# Patient Record
Sex: FEMALE | Race: WHITE | Employment: FULL TIME | URBAN - METROPOLITAN AREA
[De-identification: names, ages, dates, MRNs, and addresses within clinical notes are randomized per-mention and may not be internally consistent; named-entity substitution may affect disease eponyms.]

---

## 2017-07-12 ENCOUNTER — OFFICE VISIT (OUTPATIENT)
Dept: OTHER | Facility: OUTPATIENT CENTER | Age: 62
End: 2017-07-12

## 2017-07-12 DIAGNOSIS — F43.10 POSTTRAUMATIC STRESS DISORDER: ICD-10-CM

## 2017-07-12 DIAGNOSIS — F43.22 ADJUSTMENT DISORDER WITH ANXIETY: Primary | ICD-10-CM
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
## 2017-07-12 NOTE — MR AVS SNAPSHOT
After Visit Summary   2017    Herminia Garcia    MRN: [id number]           Patient Information     Date Of Birth          1955        Visit Information        Provider Department      2017 10:00 AM Mahendra Kiser, PhD [facility]        Today's Diagnoses     Adjustment disorder with anxiety    -  1    Posttraumatic stress disorder per history            Follow-ups after your visit        Your next 10 appointments already scheduled     Aug 28, 2017  1:30 PM CDT   INDIVIDUAL THERAPY with ELIJAH Byers   [facility]    [address]  [address]  [facility] [address]   [phone]              Who to contact     Please call your clinic at [phone] to:    Ask questions about your health    Make or cancel appointments    Discuss your medicines    Learn about your test results    Speak to your doctor   If you have compliments or concerns about an experience at your clinic, or if you wish to file a complaint, please contact [facility] Patient Relations at [phone] or email us at [email or web address]         Additional Information About Your Visit        MyChart Information     [email or web address] is an electronic gateway that provides easy, online access to your medical records. With [email or web address], you can request a clinic appointment, read your test results, renew a prescription or communicate with your care team.     To sign up for [employer] visit the website at [email or web address]   You will be asked to enter the access code listed below, as well as some personal information. Please follow the directions to create your username and password.     Your access code is: [email or web address]  Expires: 2017 12:22 PM     Your access code will  in 90 days. If you need help or a new code, please contact your [facility] Clinic or call [phone] for  assistance.        Care EveryWhere ID     This is your Care EveryWhere ID. This could be used by other organizations to access your [place] medical records  [id number]         Blood Pressure from Last 3 Encounters:   No data found for BP    Weight from Last 3 Encounters:   No data found for Wt              We Performed the Following     Diagnostic Assessment (complete) [16145]        Primary Care Provider    None Specified       No primary provider on file.        Equal Access to Services     [facility]: Hadii aad ku hadasho Soomaali, waaxda luqadaha, qaybta kaalmada adeegyada, waxay prakashin hayaan adeeneida hartricardolara ladeisy . So [facility] [phone].    ATENCIÓN: Si habla español, tiene a garcia disposición servicios gratuitos de asistencia lingüística. Llame al [phone].    We comply with applicable federal civil rights laws and [place] laws. We do not discriminate on the basis of race, color, national origin, age, disability sex, sexual orientation or gender identity.            Thank you!     Thank you for choosing [place] [facility]  for your care. Our goal is always to provide you with excellent care. Hearing back from our patients is one way we can continue to improve our services. Please take a few minutes to complete the written survey that you may receive in the mail after your visit with us. Thank you!             Your Updated Medication List - Protect others around you: Learn how to safely use, store and throw away your medicines at www.disposemymeds.org.      Notice  As of 7/12/2017 11:59 PM    You have not been prescribed any medications.

## 2017-07-13 ASSESSMENT — ANXIETY QUESTIONNAIRES
1. FEELING NERVOUS, ANXIOUS, OR ON EDGE: NOT AT ALL
3. WORRYING TOO MUCH ABOUT DIFFERENT THINGS: NOT AT ALL
6. BECOMING EASILY ANNOYED OR IRRITABLE: NOT AT ALL
7. FEELING AFRAID AS IF SOMETHING AWFUL MIGHT HAPPEN: NOT AT ALL
GAD7 TOTAL SCORE: 0
2. NOT BEING ABLE TO STOP OR CONTROL WORRYING: NOT AT ALL
4. TROUBLE RELAXING: NOT AT ALL
5. BEING SO RESTLESS THAT IT IS HARD TO SIT STILL: NOT AT ALL

## 2017-07-14 PROBLEM — F43.22 ADJUSTMENT DISORDER WITH ANXIETY: Status: ACTIVE | Noted: 2017-07-14

## 2017-07-14 PROBLEM — F43.10 POSTTRAUMATIC STRESS DISORDER: Status: ACTIVE | Noted: 2017-07-14

## 2017-07-14 ASSESSMENT — PATIENT HEALTH QUESTIONNAIRE - PHQ9: SUM OF ALL RESPONSES TO PHQ QUESTIONS 1-9: 1

## 2017-07-14 ASSESSMENT — ANXIETY QUESTIONNAIRES: GAD7 TOTAL SCORE: 0

## 2017-08-14 ENCOUNTER — OFFICE VISIT (OUTPATIENT)
Dept: OTHER | Facility: OUTPATIENT CENTER | Age: 62
End: 2017-08-14

## 2017-08-14 DIAGNOSIS — F43.22 ADJUSTMENT DISORDER WITH ANXIETY: Primary | ICD-10-CM

## 2017-08-14 NOTE — MR AVS SNAPSHOT
After Visit Summary   2017    Herminia Garcia    MRN: [id number]           Patient Information     Date Of Birth          1955        Visit Information        Provider Department      2017 2:30 PM Diane Menendez LMFT [facility]        Today's Diagnoses     Adjustment disorder with anxiety    -  1       Follow-ups after your visit        Your next 10 appointments already scheduled     Aug 28, 2017  1:30 PM CDT   INDIVIDUAL THERAPY with ELIJAH Byers   [place] [facility] ([facility])    [address]  Mail [address] 7521  [facility] [address]   [phone]              Who to contact     Please call your clinic at [phone] to:    Ask questions about your health    Make or cancel appointments    Discuss your medicines    Learn about your test results    Speak to your doctor   If you have compliments or concerns about an experience at your clinic, or if you wish to file a complaint, please contact [facility] Patient Relations at [phone] or email us at [email or web address]         Additional Information About Your Visit        MyChart Information     xaitment is an electronic gateway that provides easy, online access to your medical records. With xaitment, you can request a clinic appointment, read your test results, renew a prescription or communicate with your care team.     To sign up for xaitment visit the website at [email or web address]   You will be asked to enter the access code listed below, as well as some personal information. Please follow the directions to create your username and password.     Your access code is: [email or web address]  Expires: 2017 12:22 PM     Your access code will  in 90 days. If you need help or a new code, please contact your [facility] or call [phone] for assistance.        Care EveryWhere ID     This is your Care  EveryWhere ID. This could be used by other organizations to access your [place] medical records  [id number]         Blood Pressure from Last 3 Encounters:   No data found for BP    Weight from Last 3 Encounters:   No data found for Wt              We Performed the Following     Individual Psychotherapy (53+ min) [48043]        Primary Care Provider    None Specified       No primary provider on file.        Equal Access to Services     [facility]: Hadii aad ku hadasho Soomaali, waaxda luqadaha, qaybta kaalmada denice, manasa hartricardolara shannon . So [facility] [phone].    ATENCIÓN: Si habla español, tiene a garcia disposición servicios gratuitos de asistencia lingüística. Llame al [phone].    We comply with applicable federal civil rights laws and [place] laws. We do not discriminate on the basis of race, color, national origin, age, disability sex, sexual orientation or gender identity.            Thank you!     Thank you for choosing [place] [facility]  for your care. Our goal is always to provide you with excellent care. Hearing back from our patients is one way we can continue to improve our services. Please take a few minutes to complete the written survey that you may receive in the mail after your visit with us. Thank you!             Your Updated Medication List - Protect others around you: Learn how to safely use, store and throw away your medicines at www.disposemymeds.org.      Notice  As of 8/14/2017 11:59 PM    You have not been prescribed any medications.

## 2017-08-15 NOTE — PROGRESS NOTES
[facility] -  Case Progress Note    Date of Service: Aug 14, 2017  Client Name: Herminia Garcia  YOB: 1955  MRN:  [id number]  Treating Provider: ELIJAH Velez  Type of Session: Individual  Present in Session: Herminia  Number of Minutes:  55    Current Symptoms/Status:  Herminia expresses marked distress over 's cross-dressing and an inability to address it directly with her .  The tension between her and her spouse due to this issue has been going on for more than 12 months.  She reports concerns over the implications of his possible gender and/or identity needs on her marriage and her sense of self and metal wellbeing.     Progress Toward Treatment Goals:   Goals will be set in next session. This was the first session with this provider.    Intervention: Modality and Description:  Building therapeutic rapport and gathering more information. Herminia discussed how the changes in her  have had a profound impact on her. She stated she feels she is living with big secrets and she feels this is unhealthy. She has spoken to one sister about the changes in her marriage, and one previous therapist. She stated the previous therapist told her to not tell anyone, which she feels has left her feeling lonely and  from her social network. Herminia described a history of having to be the problem solver and ask for more communication from her . She stated she has built up resentment due to feeling emotionally distant and alone when facing family problems. With current changes, she feels her  is not considering her perspective and she feels like a non-person, though she is also happy that he is finding himself. This has left her feeling very ambivalent about the changes.    Herminia also discussed at length her health problems and how stressful they have been to her. She has sought help through several complementary health practitioners, and engages in  meditation to address nutritional needs and relaxation/stress management.    Response to Intervention:  Herminia was very open and process oriented.    Assignment:  Return for next session    Diagnosis:  309.24 (F43.22) Adjustment Disorder with Anxiety     Plan / Need for Future Services:  Return for therapy in 2 weeks.      Diane Menendez LMFT

## 2017-08-19 NOTE — PROGRESS NOTES
I did not personally see the patient.  I reviewed and agree with the assessment and plan as documented in this note.     Gabrielle Joyce PsyD, LP

## 2017-08-28 ENCOUNTER — OFFICE VISIT (OUTPATIENT)
Dept: OTHER | Facility: OUTPATIENT CENTER | Age: 62
End: 2017-08-28

## 2017-08-28 DIAGNOSIS — F43.22 ADJUSTMENT DISORDER WITH ANXIETY: Primary | ICD-10-CM

## 2017-08-28 NOTE — MR AVS SNAPSHOT
After Visit Summary   8/28/2017    Herminia Garcia    MRN: [id number]           Patient Information     Date Of Birth          1955        Visit Information        Provider Department      8/28/2017 1:30 PM Diane Menendez [facility] Sexual Health        Today's Diagnoses     Adjustment disorder with anxiety    -  1       Follow-ups after your visit        Your next 10 appointments already scheduled     Sep 14, 2017  2:00 PM CDT   INDIVIDUAL THERAPY with Diane Menendez [facility] ([facility])    [address]  Mail [address] 7521  [facility] [address]   [phone]            Sep 28, 2017  2:00 PM CDT   INDIVIDUAL THERAPY with Diane Menendez [facility] ([facility])    [address]  [address] 75[address]  [facility] [address]   [phone]            Oct 12, 2017  2:00 PM CDT   INDIVIDUAL THERAPY with Diane Menendez [facility] ([facility])    [address]  [address] 7521  [facility] [address]   [phone]            Oct 26, 2017  2:00 PM CDT   INDIVIDUAL THERAPY with Diane Menendez [facility] ([facility])    [address]  [address] 75[address]  [facility] [address]   [phone]              Who to contact     Please call your clinic at [phone] to:    Ask questions about your health    Make or cancel appointments    Discuss your medicines    Learn about your test results    Speak to your doctor   If you have compliments or concerns about an experience at your clinic, or if you wish to file a complaint, please contact [facility] Patient Relations at [phone] or email us at [email or web address]         Additional Information About Your Visit        Robert Applebaum MDhart Information     [employer] is an electronic gateway that provides easy, online access to your medical records. With [employer], you can  request a clinic appointment, read your test results, renew a prescription or communicate with your care team.     To sign up for Intelligent InSitest visit the website at [email or web address]   You will be asked to enter the access code listed below, as well as some personal information. Please follow the directions to create your username and password.     Your access code is: [email or web address]  Expires: 2017 12:22 PM     Your access code will  in 90 days. If you need help or a new code, please contact your [facility] or call [phone] for assistance.        Care EveryWhere ID     This is your Care EveryWhere ID. This could be used by other organizations to access your [place] medical records  [id number]         Blood Pressure from Last 3 Encounters:   No data found for BP    Weight from Last 3 Encounters:   No data found for Wt              We Performed the Following     Individual Psychotherapy (53+ min) [81637]        Primary Care Provider    None Specified       No primary provider on file.        Equal Access to Services     [facility] : Hadii les ku hadasho Soomaali, waaxda luqadaha, qaybta kaalmada adeegyada, manasa shannon . So [facility] [phone].    ATENCIÓN: Si habla español, tiene a garcia disposición servicios gratuitos de asistencia lingüística. Llame al [phone].    We comply with applicable federal civil rights laws and [place] laws. We do not discriminate on the basis of race, color, national origin, age, disability sex, sexual orientation or gender identity.            Thank you!     Thank you for choosing [place] [facility]  for your care. Our goal is always to provide you with excellent care. Hearing back from our patients is one way we can continue to improve our services. Please take a few minutes to complete the written survey that you may receive in the mail after your visit with us. Thank you!             Your Updated  Medication List - Protect others around you: Learn how to safely use, store and throw away your medicines at www.disposemymeds.org.      Notice  As of 8/28/2017 11:59 PM    You have not been prescribed any medications.
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
## 2017-09-14 ENCOUNTER — OFFICE VISIT (OUTPATIENT)
Dept: OTHER | Facility: OUTPATIENT CENTER | Age: 62
End: 2017-09-14

## 2017-09-14 DIAGNOSIS — F43.22 ADJUSTMENT DISORDER WITH ANXIETY: Primary | ICD-10-CM

## 2017-09-14 NOTE — MR AVS SNAPSHOT
After Visit Summary   9/14/2017    Herminia Garcia    MRN: [id number]           Patient Information     Date Of Birth          1955        Visit Information        Provider Department      9/14/2017 2:00 PM Diane Menendez [facility] Sexual Health        Today's Diagnoses     Adjustment disorder with anxiety    -  1       Follow-ups after your visit        Your next 10 appointments already scheduled     Sep 28, 2017  2:00 PM CDT   INDIVIDUAL THERAPY with Diane Menendez [facility] ([facility])    [address]  Mail [address] 7521  [facility] [address]   [phone]            Oct 12, 2017  2:00 PM CDT   INDIVIDUAL THERAPY with Diane Menendez [facility] ([facility])    [address]  [address] 75[address]  [facility] [address]   [phone]            Oct 26, 2017  2:00 PM CDT   INDIVIDUAL THERAPY with Diane Menendez [facility] ([facility])    [address]  [address] 75[address]  [facility] [address]   [phone]            Nov 08, 2017  2:00 PM CST   INDIVIDUAL THERAPY with Diane Menendez [facility] ([facility])    [address]  [address] 75[address]  [facility] [address]   [phone]            Nov 22, 2017  2:00 PM CST   INDIVIDUAL THERAPY with Diane Menendez [facility] for [facility] ([facility])    [address]  [address]  [facility] [address]   [phone]              Who to contact     Please call your clinic at [phone] to:    Ask questions about your health    Make or cancel appointments    Discuss your medicines    Learn about your test results    Speak to your doctor   If you have compliments or concerns about an experience at your clinic, or if you wish to file a complaint, please contact [facility] Patient Relations at [phone] or email us at  [email or web address]         Additional Information About Your Visit        CovarioharCozy Information     [employer] is an electronic gateway that provides easy, online access to your medical records. With rVue, you can request a clinic appointment, read your test results, renew a prescription or communicate with your care team.     To sign up for rVue visit the website at [email or web address]   You will be asked to enter the access code listed below, as well as some personal information. Please follow the directions to create your username and password.     Your access code is: [email or web address]  Expires: 2017 12:22 PM     Your access code will  in 90 days. If you need help or a new code, please contact your [facility] or call [phone] for assistance.        Care EveryWhere ID     This is your Care EveryWhere ID. This could be used by other organizations to access your [place] medical records  [id number]         Blood Pressure from Last 3 Encounters:   No data found for BP    Weight from Last 3 Encounters:   No data found for Wt              We Performed the Following     Individual Psychotherapy (53+ min) [86065]        Primary Care Provider    None Specified       No primary provider on file.        Equal Access to Services     SILVANO MANE AH: Saadia Tamez, wasamir saldana, qaradha kanancyda denice, manasa dixon. So [facility] [phone].    ATENCIÓN: Si habla español, tiene a garcia disposición servicios gratuitos de asistencia lingüística. Llame al [phone].    We comply with applicable federal civil rights laws and [place] laws. We do not discriminate on the basis of race, color, national origin, age, disability sex, sexual orientation or gender identity.            Thank you!     Thank you for choosing [facility]  for your care. Our goal is always to provide you with excellent care. Hearing  back from our patients is one way we can continue to improve our services. Please take a few minutes to complete the written survey that you may receive in the mail after your visit with us. Thank you!             Your Updated Medication List - Protect others around you: Learn how to safely use, store and throw away your medicines at www.disposemymeds.org.      Notice  As of 9/14/2017 11:59 PM    You have not been prescribed any medications.

## 2017-09-18 NOTE — PROGRESS NOTES
[place] [facility] -  Case Progress Note    Date of Service: Sept 14, 2017  Client Name: Herminia Garcia  YOB: 1955  MRN:  [id number]  Treating Provider: ELIJAH Velez  Type of Session: Individual  Present in Session: Herminia  Number of Minutes:  55    Current Symptoms/Status:  Herminia expresses marked distress over 's cross-dressing and an inability to address it directly with her .  The tension between her and her spouse due to this issue has been going on for more than 12 months.  She reports concerns over the implications of his possible gender and/or identity needs on her marriage and her sense of self and metal wellbeing.     Progress Toward Treatment Goals:   Goals include decreasing worrying and adjust to changes related to Don's recent expressed interest in identifying as transgender.    Intervention: Modality and Description:  Interpersonal therapy to address Herminia's concerns about her marriage. Herminia stated she feels isolated and left behind in her 's changes. She described both anger at Don due to a history of making her the person who always has to problem solve and sadness that she feels she has lost her partner. We discussed her tendency towards adjusting to Don and putting his feelings before hers. She noted that she has always worried about his depression and caretaked him. She described caretaking in her family of origin as a middle child who was worried about peace.    Response to Intervention:  Herminia was very open and process oriented.    Assignment:  Journal about self-care    Diagnosis:  309.24 (F43.22) Adjustment Disorder with Anxiety     Plan / Need for Future Services:  Return for therapy in 2 weeks.      ELIJAH Velez

## 2017-09-27 ENCOUNTER — TELEPHONE (OUTPATIENT)
Dept: OTHER | Facility: OUTPATIENT CENTER | Age: 62
End: 2017-09-27

## 2017-09-27 NOTE — TELEPHONE ENCOUNTER
Returned a phone call from Herminia regarding denial by P. We discussed options including: referral to Alexsander Paul MS, LMFT, at [facility]; changing to [employer] for Jan 1; starting family therapy earlier; private pay. Herminia will check her finances and discuss with Kaylie Menendez, PhD, LMFT

## 2018-01-03 ENCOUNTER — OFFICE VISIT (OUTPATIENT)
Dept: OTHER | Facility: OUTPATIENT CENTER | Age: 63
End: 2018-01-03

## 2018-01-03 DIAGNOSIS — F43.22 ADJUSTMENT DISORDER WITH ANXIETY: Primary | ICD-10-CM
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
## 2018-01-03 NOTE — MR AVS SNAPSHOT
After Visit Summary   1/3/2018    Herminia Garcia    MRN: [id number]           Patient Information     Date Of Birth          1955        Visit Information        Provider Department      1/3/2018 11:00 AM Diane Menendez LMFT [facility]        Today's Diagnoses     Adjustment disorder with anxiety    -  1       Follow-ups after your visit        Your next 10 appointments already scheduled     2018 11:00 AM CST   INDIVIDUAL THERAPY with ELIJAH Byers   [facility]    [address]  [address] 7521  [facility] [address]   [phone]              Who to contact     Please call your clinic at [phone] to:    Ask questions about your health    Make or cancel appointments    Discuss your medicines    Learn about your test results    Speak to your doctor   If you have compliments or concerns about an experience at your clinic, or if you wish to file a complaint, please contact [facility] Patient Relations at [phone] or email us at [email or web address]         Additional Information About Your Visit        MyChart Information     [employer] is an electronic gateway that provides easy, online access to your medical records. With [employer], you can request a clinic appointment, read your test results, renew a prescription or communicate with your care team.     To sign up for TripsByTips visit the website at [email or web address]   You will be asked to enter the access code listed below, as well as some personal information. Please follow the directions to create your username and password.     Your access code is: [email or web address]  Expires: 4/3/2018  1:15 PM     Your access code will  in 90 days. If you need help or a new code, please contact your [facility] or call [phone] for assistance.        Care EveryWhere ID     This is your Care  EveryWhere ID. This could be used by other organizations to access your [place] medical records  [id number]         Blood Pressure from Last 3 Encounters:   No data found for BP    Weight from Last 3 Encounters:   No data found for Wt              We Performed the Following     Individual Psychotherapy (53+ min) [14743]        Primary Care Provider    None Specified       No primary provider on file.        Equal Access to Services     [facility]: Hadii aad ku hadasho Soomaali, waaxda luqadaha, qaybta kaalmada denice, manasa hartricardolara shannon . So [facility] [phone].    ATENCIÓN: Si habla español, tiene a garcia disposición servicios gratuitos de asistencia lingüística. Llame al [phone].    We comply with applicable federal civil rights laws and [place] laws. We do not discriminate on the basis of race, color, national origin, age, disability, sex, sexual orientation, or gender identity.            Thank you!     Thank you for choosing [place] [facility]  for your care. Our goal is always to provide you with excellent care. Hearing back from our patients is one way we can continue to improve our services. Please take a few minutes to complete the written survey that you may receive in the mail after your visit with us. Thank you!             Your Updated Medication List - Protect others around you: Learn how to safely use, store and throw away your medicines at www.disposemymeds.org.      Notice  As of 1/3/2018  1:15 PM    You have not been prescribed any medications.

## 2018-01-18 ENCOUNTER — OFFICE VISIT (OUTPATIENT)
Dept: OTHER | Facility: OUTPATIENT CENTER | Age: 63
End: 2018-01-18
Payer: COMMERCIAL

## 2018-01-18 DIAGNOSIS — F43.22 ADJUSTMENT DISORDER WITH ANXIETY: Primary | ICD-10-CM

## 2018-01-18 NOTE — MR AVS SNAPSHOT
After Visit Summary   1/18/2018    Herminia Garcia    MRN: [id number]           Patient Information     Date Of Birth          1955        Visit Information        Provider Department      1/18/2018 11:00 AM Diane Menendez [facility] Sexual Health        Today's Diagnoses     Adjustment disorder with anxiety    -  1       Follow-ups after your visit        Your next 10 appointments already scheduled     Jan 31, 2018  2:00 PM CST   INDIVIDUAL THERAPY with Diane Menendez [facility] ([facility])    [address]  Mail Code 7521  [facility] [address]   [phone]            Feb 13, 2018  2:00 PM CST   INDIVIDUAL THERAPY with Diane Menendez [facility] ([facility])    [address]  Mail [address] 7521  [facility] [address]   [phone]              Who to contact     Please call your clinic at [phone] to:    Ask questions about your health    Make or cancel appointments    Discuss your medicines    Learn about your test results    Speak to your doctor   If you have compliments or concerns about an experience at your clinic, or if you wish to file a complaint, please contact [facility] Patient Relations at [phone] or email us at [email or web address]         Additional Information About Your Visit        MyChart Information     [employer] is an electronic gateway that provides easy, online access to your medical records. With [employer], you can request a clinic appointment, read your test results, renew a prescription or communicate with your care team.     To sign up for Cogency Softwaret visit the website at [email or web address]   You will be asked to enter the access code listed below, as well as some personal information. Please follow the directions to create your username and password.     Your access code is: [email or web address]  Expires: 4/3/2018  1:15 PM     Your  access code will  in 90 days. If you need help or a new code, please contact your [facility] or call [phone] for assistance.        Care EveryWhere ID     This is your Care EveryWhere ID. This could be used by other organizations to access your [place] medical records  [id number]         Blood Pressure from Last 3 Encounters:   No data found for BP    Weight from Last 3 Encounters:   No data found for Wt              We Performed the Following     Individual Psychotherapy (53+ min) [70425]        Primary Care Provider    None Specified       No primary provider on file.        Equal Access to Services     [facility]: Hadii aad janet hadhiwoto Soannie, waaxda luqadaha, qaybstephanie kaalmamarlee galdamez, manasa shannon . So [facility] [phone].    ATENCIÓN: Si habla español, tiene a garcia disposición servicios gratuitos de asistencia lingüística. Llame al [phone].    We comply with applicable federal civil rights laws and [place] laws. We do not discriminate on the basis of race, color, national origin, age, disability, sex, sexual orientation, or gender identity.            Thank you!     Thank you for choosing [place] [facility]  for your care. Our goal is always to provide you with excellent care. Hearing back from our patients is one way we can continue to improve our services. Please take a few minutes to complete the written survey that you may receive in the mail after your visit with us. Thank you!             Your Updated Medication List - Protect others around you: Learn how to safely use, store and throw away your medicines at www.disposemymeds.org.      Notice  As of 2018 11:59 PM    You have not been prescribed any medications.

## 2018-02-07 ENCOUNTER — OFFICE VISIT (OUTPATIENT)
Dept: OTHER | Facility: OUTPATIENT CENTER | Age: 63
End: 2018-02-07
Payer: COMMERCIAL

## 2018-02-07 DIAGNOSIS — F43.22 ADJUSTMENT DISORDER WITH ANXIETY: Primary | ICD-10-CM

## 2018-02-07 NOTE — MR AVS SNAPSHOT
After Visit Summary   2018    Herminia Garcia    MRN: [id number]           Patient Information     Date Of Birth          1955        Visit Information        Provider Department      2018 2:00 PM Diane Menendez LMFT [facility]        Today's Diagnoses     Adjustment disorder with anxiety    -  1       Follow-ups after your visit        Your next 10 appointments already scheduled     2018  2:00 PM CST   INDIVIDUAL THERAPY with ELIJAH Byers   [place] [facility])    [address]  Mail Code 7521  [facility] [address]   [phone]              Who to contact     Please call your clinic at [phone] to:    Ask questions about your health    Make or cancel appointments    Discuss your medicines    Learn about your test results    Speak to your doctor            Additional Information About Your Visit        MyChart Information     Luminal is an electronic gateway that provides easy, online access to your medical records. With Luminal, you can request a clinic appointment, read your test results, renew a prescription or communicate with your care team.     To sign up for PINC Solutionst visit the website at [email or web address]   You will be asked to enter the access code listed below, as well as some personal information. Please follow the directions to create your username and password.     Your access code is: [email or web address]  Expires: 4/3/2018  1:15 PM     Your access code will  in 90 days. If you need help or a new code, please contact your [facility] or call [phone] for assistance.        Care EveryWhere ID     This is your Care EveryWhere ID. This could be used by other organizations to access your [place] medical records  [id number]         Blood Pressure from Last 3 Encounters:   No data found for BP    Weight from Last 3 Encounters:   No data found for Wt               We Performed the Following     Individual Psychotherapy (53+ min) [35085]        Primary Care Provider    None Specified       No primary provider on file.        Equal Access to Services     SILVANO MANE : Saadia Tamez, khalif saldana, quiqueradha chuamamarlee galdamez, waxsuzanne prakashin hayaaebony morriseneida haileyricardolara joshiZenobiasuma dixon. So [facility] [phone].    ATENCIÓN: Si habla español, tiene a garcia disposición servicios gratuitos de asistencia lingüística. Llame al [phone].    We comply with applicable federal civil rights laws and [place] laws. We do not discriminate on the basis of race, color, national origin, age, disability, sex, sexual orientation, or gender identity.            Thank you!     Thank you for choosing [place] [facility]  for your care. Our goal is always to provide you with excellent care. Hearing back from our patients is one way we can continue to improve our services. Please take a few minutes to complete the written survey that you may receive in the mail after your visit with us. Thank you!             Your Updated Medication List - Protect others around you: Learn how to safely use, store and throw away your medicines at www.disposemymeds.org.      Notice  As of 2/7/2018 11:59 PM    You have not been prescribed any medications.

## 2018-02-08 NOTE — PROGRESS NOTES
[place] [facility] -  Case Progress Note    Date of Service: Feb 7, 2018  Client Name: Herminia Garcia  YOB: 1955  MRN:  [id number]  Treating Provider: ELIJAH Velez  Type of Session: Individual  Present in Session: Herminia  Number of Minutes:  60    Current Symptoms/Status:  Herminia expresses marked distress over 's cross-dressing and an inability to address it directly with her .  The tension between her and her spouse due to this issue has been going on for more than 12 months.  She reports concerns over the implications of his possible gender and/or identity needs on her marriage and her sense of self and metal wellbeing.     Progress Toward Treatment Goals:   Goals include decreasing worrying and adjust to changes related to Trenton's recent expressed interest in identifying as transgender.    Intervention: Modality and Description:  Interpersonal therapy to address Herminia's concerns about her marriage. Herminia discussed her anxiety about hiding Trenton's identity from others and how this has had a large impact on her relationships. Because relationships are part of her own coping, this is important to address with Trenton. Herminia stated that she wants to find a way to be able to come to some shared understanding with Trenton, whether or not they stay together. She problem solved ways to communicate her needs and feelings to Trenton.     Herminia also addressed her needs related to maintaining physical health and reducing pain.    Response to Intervention:  Herminia was very open and process oriented.    Assignment:  Journal about self-care    Diagnosis:  309.24 (F43.22) Adjustment Disorder with Anxiety     Plan / Need for Future Services:  Return for therapy in 2 weeks.      ELIJAH Velez

## 2018-02-22 ENCOUNTER — OFFICE VISIT (OUTPATIENT)
Dept: OTHER | Facility: OUTPATIENT CENTER | Age: 63
End: 2018-02-22
Payer: COMMERCIAL

## 2018-02-22 DIAGNOSIS — F43.22 ADJUSTMENT DISORDER WITH ANXIETY: Primary | ICD-10-CM

## 2018-02-22 NOTE — MR AVS SNAPSHOT
After Visit Summary   2018    Herminia Garcia    MRN: [id number]           Patient Information     Date Of Birth          1955        Visit Information        Provider Department      2018 2:00 PM Diane Menendez LMFT [facility]        Today's Diagnoses     Adjustment disorder with anxiety    -  1       Follow-ups after your visit        Your next 10 appointments already scheduled     Mar 08, 2018  2:00 PM CST   INDIVIDUAL THERAPY with ELIJAH Byers   [place] [facility]    [address]  Mail Code 7521  [facility] [address]   [phone]              Who to contact     Please call your clinic at [phone] to:    Ask questions about your health    Make or cancel appointments    Discuss your medicines    Learn about your test results    Speak to your doctor            Additional Information About Your Visit        MyChart Information     XimoXi is an electronic gateway that provides easy, online access to your medical records. With XimoXi, you can request a clinic appointment, read your test results, renew a prescription or communicate with your care team.     To sign up for Humancot visit the website at [email or web address]   You will be asked to enter the access code listed below, as well as some personal information. Please follow the directions to create your username and password.     Your access code is: [email or web address]  Expires: 4/3/2018  1:15 PM     Your access code will  in 90 days. If you need help or a new code, please contact your [facility] or call [phone] for assistance.        Care EveryWhere ID     This is your Care EveryWhere ID. This could be used by other organizations to access your [place] medical records  [id number]         Blood Pressure from Last 3 Encounters:   No data found for BP    Weight from Last 3 Encounters:   No data found for Wt               We Performed the Following     Individual Psychotherapy (53+ min) [87144]        Primary Care Provider    None Specified       No primary provider on file.        Equal Access to Services     SILVANO MANE : Saadia Tamez, khalif saldana, quiqueradha chuamamarlee galdamez, waxsuzanne prakashin hayaaebony morriseneida haileyricardolara joshiZenobiasuma dixon. So [facility] [phone].    ATENCIÓN: Si habla español, tiene a garcia disposición servicios gratuitos de asistencia lingüística. Llame al [phone].    We comply with applicable federal civil rights laws and [place] laws. We do not discriminate on the basis of race, color, national origin, age, disability, sex, sexual orientation, or gender identity.            Thank you!     Thank you for choosing [facility]  for your care. Our goal is always to provide you with excellent care. Hearing back from our patients is one way we can continue to improve our services. Please take a few minutes to complete the written survey that you may receive in the mail after your visit with us. Thank you!             Your Updated Medication List - Protect others around you: Learn how to safely use, store and throw away your medicines at www.disposemymeds.org.      Notice  As of 2/22/2018 11:59 PM    You have not been prescribed any medications.

## 2018-02-24 NOTE — PROGRESS NOTES
[place] [facility] -  Case Progress Note    Date of Service: Feb 22, 2018  Client Name: Herminia Garcia  YOB: 1955  MRN:  [id number]  Treating Provider: ELIJAH Velez  Type of Session: Individual  Present in Session: Herminia  Number of Minutes:  60    Current Symptoms/Status:  Herminia expresses marked distress over 's cross-dressing and an inability to address it directly with her .  The tension between her and her spouse due to this issue has been going on for more than 12 months.  She reports concerns over the implications of his possible gender and/or identity needs on her marriage and her sense of self and metal wellbeing.     Progress Toward Treatment Goals:   Goals include decreasing worrying and adjust to changes related to Don's recent expressed interest in identifying as transgender.    Intervention: Modality and Description:  Interpersonal therapy to address Herminia's concerns about her marriage. Herminia stated that she has been in a place of increased stress due to the tension in her marriage. She brought in some poetry that she wrote in regards to her feelings and we reviewed this. She stated she has been especially worried about their upcoming joint session and we clarified what she would like out of the session, including validation and being able to co-write the next chapter of their story together rather than feeling coerced into a position. She stated that the feeling of being coerced has triggered old feelings of trauma. She also addressed her desire to protect Don and support him, and how that conflicts with her need to take care of herself and maintain needed boundaries.    Response to Intervention:  Herminia was very open and process oriented.    Assignment:  Journal about self-care    Diagnosis:  309.24 (F43.22) Adjustment Disorder with Anxiety     Plan / Need for Future Services:  Return for therapy in 2 weeks.      ELIJAH Velez

## 2018-03-08 ENCOUNTER — OFFICE VISIT (OUTPATIENT)
Dept: OTHER | Facility: OUTPATIENT CENTER | Age: 63
End: 2018-03-08
Payer: COMMERCIAL

## 2018-03-08 DIAGNOSIS — F43.22 ADJUSTMENT DISORDER WITH ANXIETY: Primary | ICD-10-CM

## 2018-03-08 NOTE — MR AVS SNAPSHOT
After Visit Summary   3/8/2018    Herminia Garcia    MRN: [id number]           Patient Information     Date Of Birth          1955        Visit Information        Provider Department      3/8/2018 2:00 PM Diane Menendez LMFT [facility]        Today's Diagnoses     Adjustment disorder with anxiety    -  1       Follow-ups after your visit        Your next 10 appointments already scheduled     Mar 22, 2018 11:00 AM CDT   Individual Therapy 53+ minutes with ELIJAH Byers   [place] [facility] ([facility])    [address]  Mail [address] 7521  [facility] [address]   [phone]              Who to contact     Please call your clinic at [phone] to:    Ask questions about your health    Make or cancel appointments    Discuss your medicines    Learn about your test results    Speak to your doctor            Additional Information About Your Visit        MyChart Information     FRUCTt is an electronic gateway that provides easy, online access to your medical records. With Emos Futures, you can request a clinic appointment, read your test results, renew a prescription or communicate with your care team.     To sign up for FRUCTt visit the website at [email or web address]   You will be asked to enter the access code listed below, as well as some personal information. Please follow the directions to create your username and password.     Your access code is: [email or web address]  Expires: 4/3/2018  2:15 PM     Your access code will  in 90 days. If you need help or a new code, please contact your [facility] or call [phone] for assistance.        Care EveryWhere ID     This is your Care EveryWhere ID. This could be used by other organizations to access your [place] medical records  [id number]         Blood Pressure from Last 3 Encounters:   No data found for BP    Weight from Last 3 Encounters:   No data found for Wt               We Performed the Following     Individual Psychotherapy (53+ min) [40897]        Primary Care Provider    None Specified       No primary provider on file.        Equal Access to Services     SLIVANO MANE : Saadia Tamez, khalif saldana, quiqueradha chuamamarlee galdamez, waxsuzanne prakashin hayaaebony morriseneida haileyricardolara joshiZenobiasuma dixon. So [facility] [phone].    ATENCIÓN: Si habla español, tiene a garcia disposición servicios gratuitos de asistencia lingüística. Llame al [phone].    We comply with applicable federal civil rights laws and [place] laws. We do not discriminate on the basis of race, color, national origin, age, disability, sex, sexual orientation, or gender identity.            Thank you!     Thank you for choosing [place] [facility]  for your care. Our goal is always to provide you with excellent care. Hearing back from our patients is one way we can continue to improve our services. Please take a few minutes to complete the written survey that you may receive in the mail after your visit with us. Thank you!             Your Updated Medication List - Protect others around you: Learn how to safely use, store and throw away your medicines at www.disposemymeds.org.      Notice  As of 3/8/2018 11:59 PM    You have not been prescribed any medications.

## 2018-03-12 NOTE — PROGRESS NOTES
[facility] -  Case Progress Note    Date of Service: March 8, 2018  Client Name: Herminia Garcia  YOB: 1955  MRN:  [id number]  Treating Provider: ELIJAH Velez  Type of Session: Individual  Present in Session: Herminia  Number of Minutes:  60  Health Maintenance Summary - Mental Health Treatment Plan       Status Date      Mental Health Tx Plan Q11 MOS Next Due 7/28/2018      Done 8/28/2017         Current Symptoms/Status:  Herminia expresses marked distress over 's cross-dressing and an inability to address it directly with her .  The tension between her and her spouse due to this issue has been going on for more than 12 months.  She reports concerns over the implications of his possible gender and/or identity needs on her marriage and her sense of self and metal wellbeing.     Progress Toward Treatment Goals:   Goals include decreasing worrying and adjust to changes related to Don's recent expressed interest in identifying as transgender.    Intervention: Modality and Description:  Interpersonal therapy to address Herminia's concerns about her marriage. She stated that the last joint session was helpful in the suggestion of sleeping apart. She has continued anxiety about addressing gender. We explored her poems from last session, encouraging her to look at the coping poem. She also expressed continued anxiety about having secrets, especially from their children. We explored ways to share this with Don.    Response to Intervention:  Herminia was very open and process oriented.    Assignment:  Journal about self-care    Diagnosis:  309.24 (F43.22) Adjustment Disorder with Anxiety     Plan / Need for Future Services:  Return for therapy in 2 weeks.      ELIJAH Velez

## 2018-03-22 ENCOUNTER — OFFICE VISIT (OUTPATIENT)
Dept: OTHER | Facility: OUTPATIENT CENTER | Age: 63
End: 2018-03-22
Payer: COMMERCIAL

## 2018-03-22 DIAGNOSIS — F43.22 ADJUSTMENT DISORDER WITH ANXIETY: Primary | ICD-10-CM

## 2018-03-22 NOTE — MR AVS SNAPSHOT
After Visit Summary   3/22/2018    Herminia Garcia    MRN: [id number]           Patient Information     Date Of Birth          1955        Visit Information        Provider Department      3/22/2018 11:00 AM Diane Menendez [facility]        Today's Diagnoses     Adjustment disorder with anxiety    -  1       Follow-ups after your visit        Who to contact     Please call your clinic at [phone] to:    Ask questions about your health    Make or cancel appointments    Discuss your medicines    Learn about your test results    Speak to your doctor            Additional Information About Your Visit        MyChart Information     Mobovivo is an electronic gateway that provides easy, online access to your medical records. With Mobovivo, you can request a clinic appointment, read your test results, renew a prescription or communicate with your care team.     To sign up for Mobovivo visit the website at [email or web address]   You will be asked to enter the access code listed below, as well as some personal information. Please follow the directions to create your username and password.     Your access code is: [email or web address]  Expires: 4/3/2018  2:15 PM     Your access code will  in 90 days. If you need help or a new code, please contact your [facility] or call [phone] for assistance.        Care EveryWhere ID     This is your Care EveryWhere ID. This could be used by other organizations to access your [place] medical records  [id number]         Blood Pressure from Last 3 Encounters:   No data found for BP    Weight from Last 3 Encounters:   No data found for Wt              We Performed the Following     Individual Psychotherapy (53+ min) [69767]        Primary Care Provider    None Specified       No primary provider on file.        Equal Access to Services     SILVANO MANE : khalif Duffy qaybta  manasa redrick shannon ah. So [place] -[phone].    ATENCIÓN: Si habla edward, tiene a garcia disposición servicios gratuitos de asistencia lingüística. Llame al [phone].    We comply with applicable federal civil rights laws and [place] laws. We do not discriminate on the basis of race, color, national origin, age, disability, sex, sexual orientation, or gender identity.            Thank you!     Thank you for choosing [place] [facility]  for your care. Our goal is always to provide you with excellent care. Hearing back from our patients is one way we can continue to improve our services. Please take a few minutes to complete the written survey that you may receive in the mail after your visit with us. Thank you!             Your Updated Medication List - Protect others around you: Learn how to safely use, store and throw away your medicines at www.disposemymeds.org.      Notice  As of 3/22/2018 11:59 PM    You have not been prescribed any medications.

## 2018-03-26 NOTE — PROGRESS NOTES
[facility] -  Case Progress Note    Date of Service: March 22, 2018  Client Name: Herminia Garcia  YOB: 1955  MRN:  [id number]  Treating Provider: ELIJHA Velez  Type of Session: Individual  Present in Session: Herminia  Number of Minutes:  60  Health Maintenance Summary - Mental Health Treatment Plan       Status Date      Mental Health Tx Plan Q11 MOS Next Due 7/28/2018      Done 8/28/2017         Current Symptoms/Status:  Herminia expresses marked distress over 's cross-dressing and an inability to address it directly with her .  The tension between her and her spouse due to this issue has been going on for more than 12 months.  She reports concerns over the implications of his possible gender and/or identity needs on her marriage and her sense of self and metal wellbeing.     Progress Toward Treatment Goals:   Goals include decreasing worrying and adjust to changes related to Trenton's recent expressed interest in identifying as transgender.    Intervention: Modality and Description:  Interpersonal therapy to address Herminia's concerns about her marriage. She stated that she continues to feel discomfort when at home and has avoided home at times. We discussed developing physical boundaries and making her space her own. Herminia also discussed uncertainty on how to share info with her children and she will bring this to her next joint session. We discussed the family dynamics and her role as communicator - she feels uncertain that Trenton will be able to communicate the necessary information and hopes for help from therapeutic team.    Response to Intervention:  Herminia was very open and process oriented.    Assignment:  Journal about self-care    Diagnosis:  309.24 (F43.22) Adjustment Disorder with Anxiety     Plan / Need for Future Services:  Return for therapy in 2 weeks.      ELIJAH Velez

## 2018-05-03 ENCOUNTER — OFFICE VISIT (OUTPATIENT)
Dept: OTHER | Facility: OUTPATIENT CENTER | Age: 63
End: 2018-05-03
Payer: COMMERCIAL

## 2018-05-03 DIAGNOSIS — F43.22 ADJUSTMENT DISORDER WITH ANXIETY: Primary | ICD-10-CM

## 2018-05-03 NOTE — MR AVS SNAPSHOT
After Visit Summary   5/3/2018    Herminia Garcia    MRN: [id number]           Patient Information     Date Of Birth          1955        Visit Information        Provider Department      5/3/2018 11:00 AM Diane Menendez [facility]        Today's Diagnoses     Adjustment disorder with anxiety    -  1       Follow-ups after your visit        Your next 10 appointments already scheduled     May 17, 2018  3:00 PM CDT   Individual Therapy 53+ minutes with Diane Menendez [facility] ([facility])    [address]  Mail Code 7521  [facility] [address]   [phone]            May 31, 2018  2:00 PM CDT   Individual Therapy 53+ minutes with Diane Menendez [facility] ([facility])    [address]  Mail [address] 7521  [facility] [address]   [phone]            Jun 13, 2018  2:00 PM CDT   Individual Therapy 53+ minutes with Diane Menendez [facility] ([facility])    [address]  Mail [address] 7521  [facility] [address]   [phone]              Who to contact     Please call your clinic at [phone] to:    Ask questions about your health    Make or cancel appointments    Discuss your medicines    Learn about your test results    Speak to your doctor            Additional Information About Your Visit        MyChart Information     Tap2print is an electronic gateway that provides easy, online access to your medical records. With Tap2print, you can request a clinic appointment, read your test results, renew a prescription or communicate with your care team.     To sign up for Distil Networkst visit the website at [email or web address]   You will be asked to enter the access code listed below, as well as some personal information. Please follow the directions to create your username and password.     Your access code is: [email or web address]  Expires: 8/4/2018  9:06 PM      Your access code will  in 90 days. If you need help or a new code, please contact your [facility] or call [phone] for assistance.        Care EveryWhere ID     This is your Care EveryWhere ID. This could be used by other organizations to access your [place] medical records  [id number]         Blood Pressure from Last 3 Encounters:   No data found for BP    Weight from Last 3 Encounters:   No data found for Wt              We Performed the Following     Individual Psychotherapy (53+ min) [76694]        Primary Care Provider    None Specified       No primary provider on file.        Equal Access to Services     [facility]: Hadii aad janet hadasho Soannie, waaxda luqadaha, qaybstephanie kaalmamarlee galdamez, manasa shannon . So [facility] [phone].    ATENCIÓN: Si habla español, tiene a garcia disposición servicios gratuitos de asistencia lingüística. [facility] [phone].    We comply with applicable federal civil rights laws and [place] laws. We do not discriminate on the basis of race, color, national origin, age, disability, sex, sexual orientation, or gender identity.            Thank you!     Thank you for choosing [facility]  for your care. Our goal is always to provide you with excellent care. Hearing back from our patients is one way we can continue to improve our services. Please take a few minutes to complete the written survey that you may receive in the mail after your visit with us. Thank you!             Your Updated Medication List - Protect others around you: Learn how to safely use, store and throw away your medicines at www.disposemymeds.org.      Notice  As of 5/3/2018 11:59 PM    You have not been prescribed any medications.

## 2018-05-07 NOTE — PROGRESS NOTES
[facility] -  Case Progress Note    Date of Service: May 3, 2018  Client Name: Herminia Garcia  YOB: 1955  MRN:  [id number]  Treating Provider: ELIJAH Velez  Type of Session: Individual  Present in Session: Herminia  Number of Minutes:  60  Health Maintenance Summary - Mental Health Treatment Plan       Status Date      Mental Health Tx Plan Q11 MOS Next Due 7/28/2018      Done 8/28/2017         Current Symptoms/Status:  Herminia expresses marked distress over 's cross-dressing and an inability to address it directly with her .  The tension between her and her spouse due to this issue has been going on for more than 12 months.  She reports concerns over the implications of his possible gender and/or identity needs on her marriage and her sense of self and metal wellbeing.     Progress Toward Treatment Goals:   Goals include decreasing worrying and adjust to changes related to Don's recent expressed interest in identifying as transgender.    Intervention: Modality and Description:  Interpersonal therapy to address Herminia's concerns about her marriage. Discussed stress management skills, social support, concerns about children, assertion of needs. Herminia expressed a need for addressing grief work in family therapy.    Response to Intervention:  Herminia was very open and process oriented.    Assignment:  Journal about self-care    Diagnosis:  309.24 (F43.22) Adjustment Disorder with Anxiety     Plan / Need for Future Services:  Return for therapy in 2 weeks.      ELIJAH Velez

## 2018-05-17 ENCOUNTER — OFFICE VISIT (OUTPATIENT)
Dept: OTHER | Facility: OUTPATIENT CENTER | Age: 63
End: 2018-05-17
Payer: COMMERCIAL

## 2018-05-17 DIAGNOSIS — F43.22 ADJUSTMENT DISORDER WITH ANXIETY: Primary | ICD-10-CM

## 2018-05-21 NOTE — PROGRESS NOTES
[facility] -  Case Progress Note    Date of Service: May 17, 2018  Client Name: Herminia Garcia  YOB: 1955  MRN:  [id number]  Treating Provider: ELIJAH Velez  Type of Session: Individual  Present in Session: Herminia  Number of Minutes:  60  Health Maintenance Summary - Mental Health Treatment Plan       Status Date      Mental Health Tx Plan Q11 MOS Next Due 7/28/2018      Done 8/28/2017         Current Symptoms/Status:  Herminia expresses marked distress over 's cross-dressing and an inability to address it directly with her .  The tension between her and her spouse due to this issue has been going on for more than 12 months.  She reports concerns over the implications of his possible gender and/or identity needs on her marriage and her sense of self and metal wellbeing.     Progress Toward Treatment Goals:   Goals include decreasing worrying and adjust to changes related to Don's recent expressed interest in identifying as transgender.    Intervention: Modality and Description:  Interpersonal therapy - Herminia reported that Don will most likely move out in the next month. She reporting feeling less distressed since this was decided. We processed what has lead to her distress having Don in same space as her. She discussed coping skills to manage ambiguity, loss, and fear.    Response to Intervention:  Herminia was very open and process oriented.    Assignment:  Journal about self-care    Diagnosis:  309.24 (F43.22) Adjustment Disorder with Anxiety     Plan / Need for Future Services:  Return for therapy in 2 weeks.      ELIJAH Velez

## 2018-05-31 ENCOUNTER — OFFICE VISIT (OUTPATIENT)
Dept: OTHER | Facility: OUTPATIENT CENTER | Age: 63
End: 2018-05-31
Payer: COMMERCIAL

## 2018-05-31 DIAGNOSIS — F43.22 ADJUSTMENT DISORDER WITH ANXIETY: Primary | ICD-10-CM

## 2018-05-31 NOTE — MR AVS SNAPSHOT
After Visit Summary   5/31/2018    Herminia Garcia    MRN: [id number]           Patient Information     Date Of Birth          1955        Visit Information        Provider Department      5/31/2018 2:00 PM Diane Menendez [facility]        Today's Diagnoses     Adjustment disorder with anxiety    -  1       Follow-ups after your visit        Your next 10 appointments already scheduled     Jun 13, 2018  2:00 PM CDT   Individual Therapy 53+ minutes with Diane Menendez [facility] ([facility])    [address]  Mail [address] 7521  [facility] [address]   [phone]            Jul 11, 2018  3:00 PM CDT   Individual Therapy 53+ minutes with Diane Menendez [facility] ([facility])    [address]  [address] 7521  [facility] [address]   [phone]            Jul 26, 2018  3:00 PM CDT   Individual Therapy 53+ minutes with Diane Menendez [facility] ([facility])    [address]  [address] 75[address]  [facility] [address]   [phone]              Who to contact     Please call your clinic at [phone] to:    Ask questions about your health    Make or cancel appointments    Discuss your medicines    Learn about your test results    Speak to your doctor            Additional Information About Your Visit        MyChart Information     [employer] is an electronic gateway that provides easy, online access to your medical records. With [employer], you can request a clinic appointment, read your test results, renew a prescription or communicate with your care team.     To sign up for Photonic Materialst visit the website at [email or web address]   You will be asked to enter the access code listed below, as well as some personal information. Please follow the directions to create your username and password.     Your access code is: [email or web address]  Expires: 8/4/2018  9:06 PM      Your access code will  in 90 days. If you need help or a new code, please contact your [facility] or call [phone] for assistance.        Care EveryWhere ID     This is your Care EveryWhere ID. This could be used by other organizations to access your [place] medical records  [id number]         Blood Pressure from Last 3 Encounters:   No data found for BP    Weight from Last 3 Encounters:   No data found for Wt              We Performed the Following     Individual Psychotherapy (53+ min) [18677]        Primary Care Provider    None Specified       No primary provider on file.        Equal Access to Services     [facility]: Hadii aad janet hadhiwoto Soannie, waaxda luqadaha, qaybstephanie kaalmamarlee galdamez, manasa shannon . So [facility] [phone].    ATENCIÓN: Si habla español, tiene a garcia disposición servicios gratuitos de asistencia lingüística. [facility] [phone].    We comply with applicable federal civil rights laws and [place] laws. We do not discriminate on the basis of race, color, national origin, age, disability, sex, sexual orientation, or gender identity.            Thank you!     Thank you for choosing [facility]  for your care. Our goal is always to provide you with excellent care. Hearing back from our patients is one way we can continue to improve our services. Please take a few minutes to complete the written survey that you may receive in the mail after your visit with us. Thank you!             Your Updated Medication List - Protect others around you: Learn how to safely use, store and throw away your medicines at www.disposemymeds.org.      Notice  As of 2018 11:59 PM    You have not been prescribed any medications.

## 2018-06-04 NOTE — PROGRESS NOTES
[facility] -  Case Progress Note    Date of Service: May 31, 2018  Client Name: Herminia Garcia  YOB: 1955  MRN:  [id number]  Treating Provider: ELIJAH Velez  Type of Session: Individual  Present in Session: Herminia  Number of Minutes:  60  Health Maintenance Summary - Mental Health Treatment Plan       Status Date      Mental Health Tx Plan Q11 MOS Next Due 7/28/2018      Done 8/28/2017         Current Symptoms/Status:  Herminia expresses marked distress over 's cross-dressing and an inability to address it directly with her .  The tension between her and her spouse due to this issue has been going on for more than 12 months.  She reports concerns over the implications of his possible gender and/or identity needs on her marriage and her sense of self and metal wellbeing.     Progress Toward Treatment Goals:   Goals include decreasing worrying and adjust to changes related to Don's recent expressed interest in identifying as transgender.    Intervention: Modality and Description:  Interpersonal therapy, stress management - Herminia processed losses she is experiencing, and we discussed stress models and how they apply to their marriage. She repeatedly made comments about not being happy and we redefined this to a goal of peaceful and better stress management versus happy, normalizing not necessarily being happy when one is  from a spouse. Herminia processed her hesitancies towards reaching out for social support, including putting Don's need for privacy above her own needs for support, and being around people who support marriage over separation. She has found it helpful to share with family members her journey and was encouraged to do this more.    Response to Intervention:  Herminia was very open and process oriented.    Assignment:  Journal about self-care    Diagnosis:  309.24 (F43.22) Adjustment Disorder with Anxiety     Plan / Need for Future  Services:  Return for therapy in 2 weeks.      ELIJAH Velez

## 2018-06-13 ENCOUNTER — OFFICE VISIT (OUTPATIENT)
Dept: OTHER | Facility: OUTPATIENT CENTER | Age: 63
End: 2018-06-13
Payer: COMMERCIAL

## 2018-06-13 DIAGNOSIS — F43.22 ADJUSTMENT DISORDER WITH ANXIETY: Primary | ICD-10-CM

## 2018-06-13 NOTE — MR AVS SNAPSHOT
After Visit Summary   2018    Herminia Garcia    MRN: [id number]           Patient Information     Date Of Birth          1955        Visit Information        Provider Department      2018 2:00 PM Diane Menendez [facility]        Today's Diagnoses     Adjustment disorder with anxiety    -  1       Follow-ups after your visit        Your next 10 appointments already scheduled     2018  3:00 PM CDT   Individual Therapy 53+ minutes with Diane Menendez [facility] ([facility])    [address]  Mail [address] 7521  [facility] [address]   [phone]            2018  3:00 PM CDT   Individual Therapy 53+ minutes with Diane Menendez [facility] ([facility])    [address]  Mail [address] 7521  [facility] [address]   [phone]              Who to contact     Please call your clinic at [phone] to:    Ask questions about your health    Make or cancel appointments    Discuss your medicines    Learn about your test results    Speak to your doctor            Additional Information About Your Visit        MyCharDealHamster Information     LookFlow is an electronic gateway that provides easy, online access to your medical records. With LookFlow, you can request a clinic appointment, read your test results, renew a prescription or communicate with your care team.     To sign up for LookFlow visit the website at [email or web address]   You will be asked to enter the access code listed below, as well as some personal information. Please follow the directions to create your username and password.     Your access code is: [email or web address]  Expires: 2018  9:06 PM     Your access code will  in 90 days. If you need help or a new code, please contact your [facility] or call [phone] for assistance.        Care EveryWhere ID     This is your Care  EveryWhere ID. This could be used by other organizations to access your [place] medical records  [id number]         Blood Pressure from Last 3 Encounters:   No data found for BP    Weight from Last 3 Encounters:   No data found for Wt              We Performed the Following     Individual Psychotherapy (38-52 min) [98125]        Primary Care Provider    None Specified       No primary provider on file.        Equal Access to Services     [facility]: Hadii aad ku hadasho Soyohannesali, waaxda luqadaha, qaybta kaalmada denice, manasa hartricardolara shannon . So [facility] [phone].    ATENCIÓN: Si habla español, tiene a garcia disposición servicios gratuitos de asistencia lingüística. Llame al [phone].    We comply with applicable federal civil rights laws and [place] laws. We do not discriminate on the basis of race, color, national origin, age, disability, sex, sexual orientation, or gender identity.            Thank you!     Thank you for choosing [facility]  for your care. Our goal is always to provide you with excellent care. Hearing back from our patients is one way we can continue to improve our services. Please take a few minutes to complete the written survey that you may receive in the mail after your visit with us. Thank you!             Your Updated Medication List - Protect others around you: Learn how to safely use, store and throw away your medicines at www.disposemymeds.org.      Notice  As of 6/13/2018 11:59 PM    You have not been prescribed any medications.
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
## 2018-07-11 ENCOUNTER — OFFICE VISIT (OUTPATIENT)
Dept: OTHER | Facility: OUTPATIENT CENTER | Age: 63
End: 2018-07-11
Payer: COMMERCIAL

## 2018-07-11 DIAGNOSIS — F43.22 ADJUSTMENT DISORDER WITH ANXIETY: Primary | ICD-10-CM

## 2018-07-11 NOTE — MR AVS SNAPSHOT
After Visit Summary   7/11/2018    Herminia Garcia    MRN: [id number]           Patient Information     Date Of Birth          1955        Visit Information        Provider Department      7/11/2018 3:00 PM Diane Menendez [facility]        Today's Diagnoses     Adjustment disorder with anxiety    -  1       Follow-ups after your visit        Your next 10 appointments already scheduled     Jul 26, 2018  3:00 PM CDT   Individual Therapy 53+ minutes with Diane Menendez [facility] ([facility])    [address]  Mail [address] 7521  [facility] [address]   [phone]            Aug 09, 2018  4:00 PM CDT   Individual Therapy 53+ minutes with Diane Menendez [facility] ([facility])    [address]  Mail [address] 7521  [facility] [address]   [phone]            Aug 30, 2018  4:00 PM CDT   Individual Therapy 53+ minutes with Diane Menendez [facility] ([facility])    [address]  [address] 7521  [facility] [address]   [phone]            Sep 11, 2018  2:00 PM CDT   Individual Therapy 53+ minutes with Diane Menendez [facility] ([facility])    [address]  [address] 75[address]  [facility] [address]   [phone]            Sep 27, 2018  4:00 PM CDT   Individual Therapy 53+ minutes with Diane Menendez [facility] ([facility])    [address]  Mail [address] 75[address]  [facility] [address]   [phone]              Who to contact     Please call your clinic at [phone] to:    Ask questions about your health    Make or cancel appointments    Discuss your medicines    Learn about your test results    Speak to your doctor            Additional Information About Your Visit        MyChart Information     UpMot is an electronic gateway that provides easy, online access to your  medical records. With Curious Hat, you can request a clinic appointment, read your test results, renew a prescription or communicate with your care team.     To sign up for Curious Hat visit the website at [email or web address]   You will be asked to enter the access code listed below, as well as some personal information. Please follow the directions to create your username and password.     Your access code is: [email or web address]  Expires: 2018  9:06 PM     Your access code will  in 90 days. If you need help or a new code, please contact your [facility] or call [phone] for assistance.        Care EveryWhere ID     This is your Care EveryWhere ID. This could be used by other organizations to access your [place] medical records  [id number]         Blood Pressure from Last 3 Encounters:   No data found for BP    Weight from Last 3 Encounters:   No data found for Wt              We Performed the Following     Individual Psychotherapy (53+ min) [36705]        Primary Care Provider    None Specified       No primary provider on file.        Equal Access to Services     [facility]: Hadii les Tamez, waaxda lugabrieladaha, qaybta kaaladrian galdamez, manasa shannon . So [facility] [phone].    ATENCIÓN: Si habla español, tiene a garcia disposición servicios gratuitos de asistencia lingüística. Llame al [phone].    We comply with applicable federal civil rights laws and [place] laws. We do not discriminate on the basis of race, color, national origin, age, disability, sex, sexual orientation, or gender identity.            Thank you!     Thank you for choosing [facility]  for your care. Our goal is always to provide you with excellent care. Hearing back from our patients is one way we can continue to improve our services. Please take a few minutes to complete the written survey that you may receive in the mail after your visit with  us. Thank you!             Your Updated Medication List - Protect others around you: Learn how to safely use, store and throw away your medicines at www.disposemymeds.org.      Notice  As of 7/11/2018 11:59 PM    You have not been prescribed any medications.
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
## 2018-07-26 ENCOUNTER — OFFICE VISIT (OUTPATIENT)
Dept: OTHER | Facility: OUTPATIENT CENTER | Age: 63
End: 2018-07-26
Payer: COMMERCIAL

## 2018-07-26 DIAGNOSIS — F43.22 ADJUSTMENT DISORDER WITH ANXIETY: Primary | ICD-10-CM

## 2018-07-26 NOTE — MR AVS SNAPSHOT
After Visit Summary   7/26/2018    Herminia Garcia    MRN: [id number]           Patient Information     Date Of Birth          1955        Visit Information        Provider Department      7/26/2018 3:00 PM Diane Menendez [facility]        Today's Diagnoses     Adjustment disorder with anxiety    -  1       Follow-ups after your visit        Your next 10 appointments already scheduled     Aug 09, 2018  4:00 PM CDT   Individual Therapy 53+ minutes with Diane Menendez [facility] ([facility])    [address]  Mail [address] 7521  [facility] [address]   [phone]            Aug 30, 2018  4:00 PM CDT   Individual Therapy 53+ minutes with Diane Menendez [facility] ([facility])    [address]  [address]  [facility] [address]   [phone]            Sep 11, 2018  2:00 PM CDT   Individual Therapy 53+ minutes with Diane Menendez [facility] ([facility])    [address]  [address] 75[address]  [facility] [address]   [phone]            Sep 27, 2018  4:00 PM CDT   Individual Therapy 53+ minutes with Diane Menendez [facility] ([facility])    [address]  [address]  [facility] [address]   [phone]              Who to contact     Please call your clinic at [phone] to:    Ask questions about your health    Make or cancel appointments    Discuss your medicines    Learn about your test results    Speak to your doctor            Additional Information About Your Visit        MyChart Information     Calixar is an electronic gateway that provides easy, online access to your medical records. With Calixar, you can request a clinic appointment, read your test results, renew a prescription or communicate with your care team.     To sign up for [employer] visit the website at [email or web address]    You will be asked to enter the access code listed below, as well as some personal information. Please follow the directions to create your username and password.     Your access code is: [email or web address]  Expires: 2018  9:06 PM     Your access code will  in 90 days. If you need help or a new code, please contact your [facility] or call [phone] for assistance.        Care EveryWhere ID     This is your Care EveryWhere ID. This could be used by other organizations to access your [place] medical records  [id number]         Blood Pressure from Last 3 Encounters:   No data found for BP    Weight from Last 3 Encounters:   No data found for Wt              We Performed the Following     Individual Psychotherapy (53+ min) [65377]        Primary Care Provider    None Specified       No primary provider on file.        Equal Access to Services     [facility] : Hadmariusz Tamez, khalif saldana, freya galdamez, manasa shannon . So [facility] [phone].    ATENCIÓN: Si habla español, tiene a garcia disposición servicios gratuitos de asistencia lingüística. Llame al [phone].    We comply with applicable federal civil rights laws and [place] laws. We do not discriminate on the basis of race, color, national origin, age, disability, sex, sexual orientation, or gender identity.            Thank you!     Thank you for choosing [facility]  for your care. Our goal is always to provide you with excellent care. Hearing back from our patients is one way we can continue to improve our services. Please take a few minutes to complete the written survey that you may receive in the mail after your visit with us. Thank you!             Your Updated Medication List - Protect others around you: Learn how to safely use, store and throw away your medicines at www.disposemymeds.org.      Notice  As of 2018 11:59 PM    You have not  been prescribed any medications.

## 2018-07-30 NOTE — PROGRESS NOTES
[facility] -  Case Progress Note    Date of Service: July 26, 2018  Client Name: Herminia Garcia  YOB: 1955  MRN:  [id number]  Treating Provider: ELIJAH Velez  Type of Session: Individual  Present in Session: Herminia  Number of Minutes:  55    Current Symptoms/Status:  Herminia expresses marked distress over 's cross-dressing and an inability to address it directly with her .  The tension between her and her spouse due to this issue has been going on for more than 12 months.  She reports concerns over the implications of his possible gender and/or identity needs on her marriage and her sense of self and metal wellbeing.     Progress Toward Treatment Goals:   Goals include decreasing worrying and adjust to changes related to Trenton's recent expressed interest in identifying as transgender.    Intervention: Modality and Description:  Interpersonal therapy, stress management - Herminia discussed feeling weepy and overwrought at times due to changes in relationship. She processed her feelings towards sharing with others her internal thoughts, and the values she attaches to openness and happiness. She brought in pictures of her family of origin and she was often at the center of the pictures. She is happy and laughing typically. She also brought in a picture of Trenton who looked sad. She contrasted the two ways of being. Herminia stated that at the last joint session, Trenton shared his female name. She questioned if she knew him and was reminded that he is just learning himself who this feminine side is, yet there will also be constants. Herminia reported feeling a great sense of loss that their family therapist is leaving [facility].     Response to Intervention:  Herminia was very open and process oriented.    Assignment:  Journal about self-care and identity    Diagnosis:  309.24 (F43.22) Adjustment Disorder with Anxiety     Plan / Need for Future Services:  Return for therapy in 2 weeks.   Do treatment plan at this visit.    ELIJAH Velez

## 2018-08-09 ENCOUNTER — OFFICE VISIT (OUTPATIENT)
Dept: OTHER | Facility: OUTPATIENT CENTER | Age: 63
End: 2018-08-09
Payer: COMMERCIAL

## 2018-08-09 DIAGNOSIS — F43.21 ADJUSTMENT DISORDER WITH DEPRESSED MOOD: Primary | ICD-10-CM

## 2018-08-09 PROBLEM — F43.22 ADJUSTMENT DISORDER WITH ANXIETY: Status: RESOLVED | Noted: 2017-07-14 | Resolved: 2018-08-09

## 2018-08-09 ASSESSMENT — ANXIETY QUESTIONNAIRES
3. WORRYING TOO MUCH ABOUT DIFFERENT THINGS: NOT AT ALL
6. BECOMING EASILY ANNOYED OR IRRITABLE: NOT AT ALL
7. FEELING AFRAID AS IF SOMETHING AWFUL MIGHT HAPPEN: NOT AT ALL
2. NOT BEING ABLE TO STOP OR CONTROL WORRYING: NOT AT ALL
5. BEING SO RESTLESS THAT IT IS HARD TO SIT STILL: NOT AT ALL
1. FEELING NERVOUS, ANXIOUS, OR ON EDGE: SEVERAL DAYS
GAD7 TOTAL SCORE: 1

## 2018-08-09 ASSESSMENT — PATIENT HEALTH QUESTIONNAIRE - PHQ9: 5. POOR APPETITE OR OVEREATING: NOT AT ALL
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
## 2018-08-09 NOTE — MR AVS SNAPSHOT
After Visit Summary   8/9/2018    Herminia Garcia    MRN: [id number]           Patient Information     Date Of Birth          1955        Visit Information        Provider Department      8/9/2018 4:00 PM Diane Menendez [facility]        Today's Diagnoses     Adjustment disorder with depressed mood    -  1       Follow-ups after your visit        Your next 10 appointments already scheduled     Aug 30, 2018  4:00 PM CDT   Individual Therapy 53+ minutes with Diane Menendez [facility] ([facility])    [address]  [address] 75[address]  [facility] [address]   [phone]            Sep 11, 2018  2:00 PM CDT   Individual Therapy 53+ minutes with Diane Menendez [facility] ([facility])    [address]  [address]  [facility] [address]   [phone]            Sep 27, 2018  4:00 PM CDT   Individual Therapy 53+ minutes with Diane Menendez [facility] ([facility])    [address]  [address]  [facility] [address]   [phone]              Who to contact     Please call your clinic at [phone] to:    Ask questions about your health    Make or cancel appointments    Discuss your medicines    Learn about your test results    Speak to your doctor            Additional Information About Your Visit        MyChart Information     Wandera is an electronic gateway that provides easy, online access to your medical records. With Wandera, you can request a clinic appointment, read your test results, renew a prescription or communicate with your care team.     To sign up for QThrut visit the website at [email or web address]   You will be asked to enter the access code listed below, as well as some personal information. Please follow the directions to create your username and password.     Your access code is: [email or web address]  Expires: 11/11/2018  9:55  AM     Your access code will  in 90 days. If you need help or a new code, please contact your [facility] or call [phone] for assistance.        Care EveryWhere ID     This is your Care EveryWhere ID. This could be used by other organizations to access your [place] medical records  [id number]         Blood Pressure from Last 3 Encounters:   No data found for BP    Weight from Last 3 Encounters:   No data found for Wt              We Performed the Following     Diagnostic Assessment (complete) [61801]     Mental Health Tx Plan Scan (HIM Scan)        Primary Care Provider    None Specified       No primary provider on file.        Equal Access to Services     [facility]: Hadii les gonzales hadcolumba Soannie, wapatriciada shaan, freya smithaladrian galdamez, manasa shannon . So [facility] [phone].    ATENCIÓN: Si habla español, tiene a garcia disposición servicios gratuitos de asistencia lingüística. Llame al [phone].    We comply with applicable federal civil rights laws and [place] laws. We do not discriminate on the basis of race, color, national origin, age, disability, sex, sexual orientation, or gender identity.            Thank you!     Thank you for choosing [facility]  for your care. Our goal is always to provide you with excellent care. Hearing back from our patients is one way we can continue to improve our services. Please take a few minutes to complete the written survey that you may receive in the mail after your visit with us. Thank you!             Your Updated Medication List - Protect others around you: Learn how to safely use, store and throw away your medicines at www.disposemymeds.org.      Notice  As of 2018 11:59 PM    You have not been prescribed any medications.

## 2018-08-10 ASSESSMENT — ANXIETY QUESTIONNAIRES: GAD7 TOTAL SCORE: 1

## 2018-08-10 ASSESSMENT — PATIENT HEALTH QUESTIONNAIRE - PHQ9: SUM OF ALL RESPONSES TO PHQ QUESTIONS 1-9: 4

## 2018-08-30 ENCOUNTER — OFFICE VISIT (OUTPATIENT)
Dept: OTHER | Facility: OUTPATIENT CENTER | Age: 63
End: 2018-08-30
Payer: COMMERCIAL

## 2018-08-30 DIAGNOSIS — F43.21 ADJUSTMENT DISORDER WITH DEPRESSED MOOD: Primary | ICD-10-CM

## 2018-08-30 NOTE — MR AVS SNAPSHOT
After Visit Summary   8/30/2018    Herminia Garcia    MRN: [id number]           Patient Information     Date Of Birth          1955        Visit Information        Provider Department      8/30/2018 4:00 PM Diane Menendez [facility]        Today's Diagnoses     Adjustment disorder with depressed mood    -  1       Follow-ups after your visit        Your next 10 appointments already scheduled     Sep 11, 2018  2:00 PM CDT   Individual Therapy 53+ minutes with Diane Menendez [facility] ([facility])    [address]  [address]  [facility] [address]   [phone]            Sep 27, 2018  4:00 PM CDT   Individual Therapy 53+ minutes with Diane Menendez [facility] ([facility])    [address]  [address]  [facility] [address]   [phone]            Oct 30, 2018  2:00 PM CDT   Individual Therapy 53+ minutes with Diane Menendez [facility] ([facility])    [address]  [address]  [facility] [address]   [phone]              Who to contact     Please call your clinic at [phone] to:    Ask questions about your health    Make or cancel appointments    Discuss your medicines    Learn about your test results    Speak to your doctor            Additional Information About Your Visit        MyChart Information     my3Dreams is an electronic gateway that provides easy, online access to your medical records. With my3Dreams, you can request a clinic appointment, read your test results, renew a prescription or communicate with your care team.     To sign up for Doctor Evidencet visit the website at [email or web address]   You will be asked to enter the access code listed below, as well as some personal information. Please follow the directions to create your username and password.     Your access code is: [email or web address]  Expires: 11/11/2018   9:55 AM     Your access code will  in 90 days. If you need help or a new code, please contact your [facility] or call [phone] for assistance.        Care EveryWhere ID     This is your Care EveryWhere ID. This could be used by other organizations to access your [place] medical records  [id number]         Blood Pressure from Last 3 Encounters:   No data found for BP    Weight from Last 3 Encounters:   No data found for Wt              We Performed the Following     Individual Psychotherapy (53+ min) [89720]        Primary Care Provider    None Specified       No primary provider on file.        Equal Access to Services     [facility]: Hadii les Tamez, wasamir saldana, freya galdamez, manasa shannon . So [facility] [phone].    ATENCIÓN: Si habla español, tiene a garcia disposición servicios gratuitos de asistencia lingüística. Llame al [phone].    We comply with applicable federal civil rights laws and [place] laws. We do not discriminate on the basis of race, color, national origin, age, disability, sex, sexual orientation, or gender identity.            Thank you!     Thank you for choosing [facility] [facility]  for your care. Our goal is always to provide you with excellent care. Hearing back from our patients is one way we can continue to improve our services. Please take a few minutes to complete the written survey that you may receive in the mail after your visit with us. Thank you!             Your Updated Medication List - Protect others around you: Learn how to safely use, store and throw away your medicines at www.disposemymeds.org.      Notice  As of 2018 11:59 PM    You have not been prescribed any medications.

## 2018-09-03 NOTE — PROGRESS NOTES
"               [facility] -  Case Progress Note    Date of Service: August 30, 2018  Client Name: Herminia Garcia  YOB: 1955  MRN:  [id number]  Treating Provider: ELIJAH Velez  Type of Session: Individual  Present in Session: Herminia  Number of Minutes:  55    Current Symptoms/Status:  Herminia expresses marked distress over 's cross-dressing and an inability to address it directly with her . Symptoms include: Anhedonia, depressed mood, fatigue, lower self-worth  Progress Toward Treatment Goals:     Goals include adjust to changes related to Trenton's recent expressed interest in identifying as transgender and marital separation.    Intervention: Modality and Description:  Interpersonal therapy, stress management -Herminia reported some physical health challenges in the past couple of weeks, including dental problems and shoulder problems. She stated that these made her more focused on her aging process and increased her sadness. She made several should statements related to how she \"should\" be coping at this point and these were challenged. Herminia discussed the cyclical nature of her sadness and this was normalized as grief over relationship. She asked about new joint therapist and I found a note from Lopez on Trenton's chart that couple will be transferred to [facility]. I suggested to Herminia that she encourage Trenton to call for an appointment. We also discussed the progression of her mother's illness and the impact that had on Herminia while she was sick.     Response to Intervention:  Herminia was very open and process oriented; she appeared discouraged today.    Assignment:  Journal about self-care and identity    Diagnosis:  Visit Diagnosis, Associated Orders, and Comments     ICD-10-CM    1. Adjustment disorder with depressed mood F43.21 Individual Psychotherapy (53+ min) [35594]              Plan / Need for Future Services:  Return for therapy in 2 weeks.     Diane DENNISON" ELIJAH Menendez

## 2018-09-11 ENCOUNTER — OFFICE VISIT (OUTPATIENT)
Dept: OTHER | Facility: OUTPATIENT CENTER | Age: 63
End: 2018-09-11
Payer: COMMERCIAL

## 2018-09-11 DIAGNOSIS — F43.21 ADJUSTMENT DISORDER WITH DEPRESSED MOOD: Primary | ICD-10-CM

## 2018-09-11 NOTE — MR AVS SNAPSHOT
After Visit Summary   9/11/2018    Herminia Garcia    MRN: [id number]           Patient Information     Date Of Birth          1955        Visit Information        Provider Department      9/11/2018 2:00 PM Diane Menendez [facility]        Today's Diagnoses     Adjustment disorder with depressed mood    -  1       Follow-ups after your visit        Your next 10 appointments already scheduled     Sep 27, 2018  4:00 PM CDT   Individual Therapy 53+ minutes with Diane Menendez [facility] ([facility])    [address]  Mail [address] 7521  [facility] [address]   [phone]            Oct 30, 2018  2:00 PM CDT   Individual Therapy 53+ minutes with Diane Menendez [facility] ([facility])    [address]  [address]  [facility] [address]   [phone]            Nov 20, 2018  4:00 PM CST   Individual Therapy 53+ minutes with Diane Menendez [facility] ([facility])    [address]  [address]  [facility] [address]   [phone]              Who to contact     Please call your clinic at [phone] to:    Ask questions about your health    Make or cancel appointments    Discuss your medicines    Learn about your test results    Speak to your doctor            Additional Information About Your Visit        MyChart Information     [employer] is an electronic gateway that provides easy, online access to your medical records. With [employer], you can request a clinic appointment, read your test results, renew a prescription or communicate with your care team.     To sign up for t3n Magaz[employer] visit the website at [email or web address]   You will be asked to enter the access code listed below, as well as some personal information. Please follow the directions to create your username and password.     Your access code is: [email or web address]  Expires: 11/11/2018   9:55 AM     Your access code will  in 90 days. If you need help or a new code, please contact your [facility] or call [phone] for assistance.        Care EveryWhere ID     This is your Care EveryWhere ID. This could be used by other organizations to access your [place] medical records  [id number]         Blood Pressure from Last 3 Encounters:   No data found for BP    Weight from Last 3 Encounters:   No data found for Wt              We Performed the Following     Individual Psychotherapy (53+ min) [88664]        Primary Care Provider    None Specified       No primary provider on file.        Equal Access to Services     [facility]: Hadii les Tamez, wasamir saldana, freya galdamez, manasa shannon . So [facility] [phone].    ATENCIÓN: Si habla español, tiene a garcia disposición servicios gratuitos de asistencia lingüística. Llame al [phone].    We comply with applicable federal civil rights laws and [place] laws. We do not discriminate on the basis of race, color, national origin, age, disability, sex, sexual orientation, or gender identity.            Thank you!     Thank you for choosing [facility]  for your care. Our goal is always to provide you with excellent care. Hearing back from our patients is one way we can continue to improve our services. Please take a few minutes to complete the written survey that you may receive in the mail after your visit with us. Thank you!             Your Updated Medication List - Protect others around you: Learn how to safely use, store and throw away your medicines at www.disposemymeds.org.      Notice  As of 2018 11:59 PM    You have not been prescribed any medications.
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
## 2018-09-27 ENCOUNTER — OFFICE VISIT (OUTPATIENT)
Dept: OTHER | Facility: OUTPATIENT CENTER | Age: 63
End: 2018-09-27
Payer: COMMERCIAL

## 2018-09-27 DIAGNOSIS — F43.21 ADJUSTMENT DISORDER WITH DEPRESSED MOOD: Primary | ICD-10-CM

## 2018-09-27 NOTE — MR AVS SNAPSHOT
After Visit Summary   2018    Herminia Garcia    MRN: [id number]           Patient Information     Date Of Birth          1955        Visit Information        Provider Department      2018 4:00 PM Diane Menendez [facility]        Today's Diagnoses     Adjustment disorder with depressed mood    -  1       Follow-ups after your visit        Your next 10 appointments already scheduled     Oct 30, 2018  2:00 PM CDT   Individual Therapy 53+ minutes with Diane Menendez [facility] ([facility])    [address]  Mail [address] 75[address]  [facility] [address]   [phone]            2018  4:00 PM CST   Individual Therapy 53+ minutes with Diane Menendez [facility] ([facility])    [address]  [address]  [facility] [address]   [phone]              Who to contact     Please call your clinic at [phone] to:    Ask questions about your health    Make or cancel appointments    Discuss your medicines    Learn about your test results    Speak to your doctor            Additional Information About Your Visit        [employer] Information     [employer] is an electronic gateway that provides easy, online access to your medical records. With [employer], you can request a clinic appointment, read your test results, renew a prescription or communicate with your care team.     To sign up for [employer] visit the website at [email or web address]   You will be asked to enter the access code listed below, as well as some personal information. Please follow the directions to create your username and password.     Your access code is: [email or web address]  Expires: 2018  9:55 AM     Your access code will  in 90 days. If you need help or a new code, please contact your [facility] or call [phone] for assistance.        Care EveryWhere ID     This is your  Care EveryWhere ID. This could be used by other organizations to access your [place] medical records  [id number]         Blood Pressure from Last 3 Encounters:   No data found for BP    Weight from Last 3 Encounters:   No data found for Wt              We Performed the Following     Individual Psychotherapy (53+ min) [84026]        Primary Care Provider    None Specified       No primary provider on file.        Equal Access to Services     [facility]: Hadii aad ku hadasho Soomaali, waaxda luqadaha, qaybta kaalmada denice, manasa hartricardolara shannon . So [facility] [phone].    ATENCIÓN: Si habla español, tiene a garcia disposición servicios gratuitos de asistencia lingüística. Llame al [phone].    We comply with applicable federal civil rights laws and [place] laws. We do not discriminate on the basis of race, color, national origin, age, disability, sex, sexual orientation, or gender identity.            Thank you!     Thank you for choosing [facility]  for your care. Our goal is always to provide you with excellent care. Hearing back from our patients is one way we can continue to improve our services. Please take a few minutes to complete the written survey that you may receive in the mail after your visit with us. Thank you!             Your Updated Medication List - Protect others around you: Learn how to safely use, store and throw away your medicines at www.disposemymeds.org.      Notice  As of 9/27/2018 11:59 PM    You have not been prescribed any medications.

## 2018-09-30 NOTE — PROGRESS NOTES
[facility] -  Case Progress Note    Date of Service: September 27, 2018  Client Name: Herminia Garcia  YOB: 1955  MRN:  [id number]  Treating Provider: ELIJAH Velez  Type of Session: Individual  Present in Session: Herminia  Number of Minutes:  55    Current Symptoms/Status:  Herminia expresses marked distress over 's cross-dressing and an inability to address it directly with her . Symptoms include: Anhedonia, depressed mood, fatigue, lower self-worth  Progress Toward Treatment Goals:     Goals include adjust to changes related to Don's recent expressed interest in identifying as transgender and marital separation.    Intervention: Modality and Description:  Interpersonal therapy, stress management. Herminia reported continued sadness related to her separation, including worries about growing older and having no one to take care of her. We addressed this thought through exploration of social network. She also expressed sadness about recent tension with her sister. Herminia expressed confusion related to Don's transition wondering if she will remain in limbo for a long time. She was reminded that there isn't necessarily one road to transition.    Response to Intervention:  Herminia was very open and process oriented.    Assignment:  Journal about self-care and identity. Use safety mantra    Diagnosis:  Visit Diagnosis, Associated Orders, and Comments     ICD-10-CM    1. Adjustment disorder with depressed mood F43.21 Individual Psychotherapy (53+ min) [97269]              Plan / Need for Future Services:  Return for therapy in 2 weeks.     ELIJAH Velez

## 2018-10-30 ENCOUNTER — OFFICE VISIT (OUTPATIENT)
Dept: OTHER | Facility: OUTPATIENT CENTER | Age: 63
End: 2018-10-30
Payer: COMMERCIAL

## 2018-10-30 DIAGNOSIS — F43.21 ADJUSTMENT DISORDER WITH DEPRESSED MOOD: Primary | ICD-10-CM

## 2018-10-30 NOTE — PROGRESS NOTES
[facility] -  Case Progress Note    Date of Service: Oct 30, 2018  Client Name: Herminia Garcia  YOB: 1955  MRN:  [id number]  Treating Provider: ELIJAH Velez  Type of Session: Individual  Present in Session: Herminia  Number of Minutes:  55    Current Symptoms/Status:  Herminia expresses marked distress over 's cross-dressing and an inability to address it directly with her . Symptoms include: Anhedonia, depressed mood, fatigue, lower self-worth  Progress Toward Treatment Goals:     Goals include adjust to changes related to Trenton's recent expressed interest in identifying as transgender and marital separation.    Intervention: Modality and Description:  Interpersonal therapy, stress management. Herminia discussed improvements in her mood, especially no longer being flooded when communicating with Trenton. She stated that she would like more clarity in regards to Don's preferred gender and we discussed the possibility that he does not know she is in a better space to hear what he has to say. She expressed distress related to information shared at last joint session, in particular that Don felt pushed out of the home. She also stated that she felt Don was very critical of her. Herminia expressed concerns about her daughter who refuses to talk to her right now. She would like to be able to communicate with Don regarding a plan for the daughter.    Response to Intervention:  Herminia was very open and process oriented.    Assignment:  Journal about self-care and identity. Use safety mantra    Diagnosis:  Visit Diagnosis, Associated Orders, and Comments     ICD-10-CM    1. Adjustment disorder with depressed mood F43.21 Individual Psychotherapy (53+ min) [22993]              Plan / Need for Future Services:  Return for therapy in 2 weeks.     ELIJAH Velez

## 2018-10-30 NOTE — MR AVS SNAPSHOT
After Visit Summary   10/30/2018    Herminia Garcia    MRN: [id number]           Patient Information     Date Of Birth          1955        Visit Information        Provider Department      10/30/2018 2:00 PM Diane Menendez [facility]        Today's Diagnoses     Adjustment disorder with depressed mood    -  1       Follow-ups after your visit        Your next 10 appointments already scheduled     Nov 20, 2018  4:00 PM CST   Individual Therapy 53+ minutes with Diane Menendez [facility] ([facility])    [address]  Mail [address] 7521  [facility] [address]   [phone]            Dec 04, 2018  3:00 PM CST   Individual Therapy 53+ minutes with Diane Menendez [facility] ([facility])    [address]  Mail [address] 7521  [facility] [address]   [phone]            Dec 18, 2018  3:00 PM CST   Individual Therapy 53+ minutes with Diane Menendez [facility] ([facility])    [address]  [address] 75[address]  [facility] [address]   [phone]              Who to contact     Please call your clinic at [phone] to:    Ask questions about your health    Make or cancel appointments    Discuss your medicines    Learn about your test results    Speak to your doctor            Additional Information About Your Visit        MyChart Information     [employer] is an electronic gateway that provides easy, online access to your medical records. With [employer], you can request a clinic appointment, read your test results, renew a prescription or communicate with your care team.     To sign up for [employer] visit the website at [email or web address]   You will be asked to enter the access code listed below, as well as some personal information. Please follow the directions to create your username and password.     Your access code is: [email or web address]  Expires: 11/11/2018   9:55 AM     Your access code will  in 90 days. If you need help or a new code, please contact your [facility] or call [phone] for assistance.        Care EveryWhere ID     This is your Care EveryWhere ID. This could be used by other organizations to access your [place] medical records  [id number]         Blood Pressure from Last 3 Encounters:   No data found for BP    Weight from Last 3 Encounters:   No data found for Wt              We Performed the Following     Individual Psychotherapy (53+ min) [75234]        Primary Care Provider    None Specified       No primary provider on file.        Equal Access to Services     [facility]: Hadii les Tamez, wasamir saldana, freya galdamez, manasa shannon . So [facility] [phone].    ATENCIÓN: Si habla español, tiene a garcia disposición servicios gratuitos de asistencia lingüística. Llame al [phone].    We comply with applicable federal civil rights laws and [place] laws. We do not discriminate on the basis of race, color, national origin, age, disability, sex, sexual orientation, or gender identity.            Thank you!     Thank you for choosing [facility]  for your care. Our goal is always to provide you with excellent care. Hearing back from our patients is one way we can continue to improve our services. Please take a few minutes to complete the written survey that you may receive in the mail after your visit with us. Thank you!             Your Updated Medication List - Protect others around you: Learn how to safely use, store and throw away your medicines at www.disposemymeds.org.      Notice  As of 10/30/2018 11:59 PM    You have not been prescribed any medications.

## 2018-11-20 ENCOUNTER — OFFICE VISIT (OUTPATIENT)
Dept: OTHER | Facility: OUTPATIENT CENTER | Age: 63
End: 2018-11-20
Payer: COMMERCIAL

## 2018-11-20 DIAGNOSIS — F43.21 ADJUSTMENT DISORDER WITH DEPRESSED MOOD: Primary | ICD-10-CM

## 2018-11-20 NOTE — MR AVS SNAPSHOT
After Visit Summary   11/20/2018    Herminia Garcia    MRN: [id number]           Patient Information     Date Of Birth          1955        Visit Information        Provider Department      11/20/2018 4:00 PM Diane Menendez [facility]        Today's Diagnoses     Adjustment disorder with depressed mood    -  1       Follow-ups after your visit        Your next 10 appointments already scheduled     Dec 04, 2018  3:00 PM CST   Individual Therapy 53+ minutes with Diane Menendez [facility] ([facility])    [address]  [address] 75[address]  [facility] [address]   [phone]            Dec 18, 2018  3:00 PM CST   Individual Therapy 53+ minutes with Diane Menendez [facility] ([facility])    [address]  [address]  [facility] [address]   [phone]            Jan 08, 2019  3:00 PM CST   Individual Therapy 53+ minutes with Diane Menendez [facility] ([facility])    [address]  [address] 75[address]  [facility] [address]   [phone]            Jan 22, 2019  3:00 PM CST   Individual Therapy 53+ minutes with Diane Menendez [facility] ([facility])    [address]  [address]  [facility] [address]   [phone]              Who to contact     Please call your clinic at [phone] to:    Ask questions about your health    Make or cancel appointments    Discuss your medicines    Learn about your test results    Speak to your doctor            Additional Information About Your Visit        Care EveryWhere ID     This is your Care EveryWhere ID. This could be used by other organizations to access your [place] medical records  [id number]         Blood Pressure from Last 3 Encounters:   No data found for BP    Weight from Last 3 Encounters:   No data found for Wt              We Performed the Following      Individual Psychotherapy (53+ min) [06412]        Primary Care Provider    None Specified       No primary provider on file.        Equal Access to Services     [facility] : Hadii aad ku hadhiwotthanh Tamez, raeganmarlee javedkikeha, nikolaistephanie smithnancymarlee galdamez, manasa mina tomiebony hartricardolara dixon. So [facility] [phone].    ATENCIÓN: Si habla español, tiene a garcia disposición servicios gratuitos de asistencia lingüística. Llame al [phone].    We comply with applicable federal civil rights laws and [place] laws. We do not discriminate on the basis of race, color, national origin, age, disability, sex, sexual orientation, or gender identity.            Thank you!     Thank you for choosing [facility]  for your care. Our goal is always to provide you with excellent care. Hearing back from our patients is one way we can continue to improve our services. Please take a few minutes to complete the written survey that you may receive in the mail after your visit with us. Thank you!             Your Updated Medication List - Protect others around you: Learn how to safely use, store and throw away your medicines at www.disposemymeds.org.      Notice  As of 11/20/2018 11:59 PM    You have not been prescribed any medications.

## 2018-12-04 ENCOUNTER — OFFICE VISIT (OUTPATIENT)
Dept: OTHER | Facility: OUTPATIENT CENTER | Age: 63
End: 2018-12-04
Payer: COMMERCIAL

## 2018-12-04 DIAGNOSIS — F43.21 ADJUSTMENT DISORDER WITH DEPRESSED MOOD: Primary | ICD-10-CM

## 2018-12-04 NOTE — MR AVS SNAPSHOT
After Visit Summary   12/4/2018    Herminia Garcia    MRN: [id number]           Patient Information     Date Of Birth          1955        Visit Information        Provider Department      12/4/2018 3:00 PM Diane Menendez [facility]        Today's Diagnoses     Adjustment disorder with depressed mood    -  1       Follow-ups after your visit        Your next 10 appointments already scheduled     Dec 18, 2018  3:00 PM CST   Individual Therapy 53+ minutes with Diane Menendez [facility] ([facility])    [address]  Mail Code 7521  [facility] [address]   [phone]            Jan 08, 2019  3:00 PM CST   Individual Therapy 53+ minutes with Diane Menendez [facility] ([facility])    [address]  Mail Code 7521  [facility] [address]   [phone]            Jan 22, 2019  3:00 PM CST   Individual Therapy 53+ minutes with Diane Menendez [facility] ([facility])    [address]  Mail [address] 75[address]  [facility] [address]   [phone]              Who to contact     Please call your clinic at [phone] to:    Ask questions about your health    Make or cancel appointments    Discuss your medicines    Learn about your test results    Speak to your doctor            Additional Information About Your Visit        Care EveryWhere ID     This is your Care EveryWhere ID. This could be used by other organizations to access your [place] medical records  [id number]         Blood Pressure from Last 3 Encounters:   No data found for BP    Weight from Last 3 Encounters:   No data found for Wt              We Performed the Following     Individual Psychotherapy (53+ min) [[id number]]        Primary Care Provider    None Specified       No primary provider on file.        Equal Access to Services     SILVANO MANE : khalif Duffy,  manasa yu tomiebony morriseneida shannon ah. So [facility] [phone].    ATENCIÓN: Si habla edward, tiene a garcia disposición servicios gratuitos de asistencia lingüística. Llame al [phone].    We comply with applicable federal civil rights laws and [place] laws. We do not discriminate on the basis of race, color, national origin, age, disability, sex, sexual orientation, or gender identity.            Thank you!     Thank you for choosing [facility]  for your care. Our goal is always to provide you with excellent care. Hearing back from our patients is one way we can continue to improve our services. Please take a few minutes to complete the written survey that you may receive in the mail after your visit with us. Thank you!             Your Updated Medication List - Protect others around you: Learn how to safely use, store and throw away your medicines at www.disposemymeds.org.      Notice  As of 12/4/2018 11:59 PM    You have not been prescribed any medications.

## 2018-12-05 NOTE — PROGRESS NOTES
[facility] -  Case Progress Note    Date of Service: Dec 4, 2018  Client Name: Herminia Garcia  YOB: 1955  MRN:  [id number]  Treating Provider: Diane Menendez, PhD, [facility]  Type of Session: Individual  Present in Session: Herminia  Number of Minutes:  55    Current Symptoms/Status:  Herminia expresses marked distress over 's cross-dressing and an inability to address it directly with her . Symptoms include: Anhedonia, depressed mood, fatigue, lower self-worth  Progress Toward Treatment Goals:     Goals include adjust to changes related to Don's recent expressed interest in identifying as transgender and marital separation.    Intervention: Modality and Description:  Interpersonal therapy, stress management. Herminia discussed coping with holidays following the loss of her mother and separation from . She has been reaching out to children more and feels this has helped. She is also finding ways to bring fun activities back into the holidays. She discussed what she wants from  and what is realistic. She stated that he acknowledged they are on a path to divorce and need to find a way to still be a family. She noted a need to figure out what to do about the house and I suggested she begin that conversation after the holidays. Herminia discussed the need to have her feelings acknowledged and witnessed and how to do this for herself.    Response to Intervention:  Herminia was very open and process oriented.    Assignment:  Journal about self-care and identity. Use safety mantra    Diagnosis:  Visit Diagnosis, Associated Orders, and Comments     ICD-10-CM    1. Adjustment disorder with depressed mood F43.21 Individual Psychotherapy (53+ min) [47505]              Plan / Need for Future Services:  Return for therapy in 2 weeks.     Diane Menendez, ELIJAH

## 2018-12-18 ENCOUNTER — OFFICE VISIT (OUTPATIENT)
Dept: OTHER | Facility: OUTPATIENT CENTER | Age: 63
End: 2018-12-18
Payer: COMMERCIAL

## 2018-12-18 DIAGNOSIS — F43.21 ADJUSTMENT DISORDER WITH DEPRESSED MOOD: Primary | ICD-10-CM

## 2018-12-19 NOTE — PROGRESS NOTES
[facility] -  Case Progress Note    Date of Service: Dec 18, 2018  Client Name: Herminia Garcia  YOB: 1955  MRN:  [id number]  Treating Provider: Diane Menendez, PhD, [facility]  Type of Session: Individual  Present in Session: Herminia  Number of Minutes:  55    Current Symptoms/Status:  Herminia expresses marked distress over 's cross-dressing and an inability to address it directly with her . Symptoms include: Anhedonia, depressed mood, fatigue, lower self-worth  Progress Toward Treatment Goals:     Goals include adjust to changes related to Don's recent expressed interest in identifying as transgender and marital separation.    Intervention: Modality and Description:  Interpersonal therapy, stress management. Herminia discussed her own grieving process as it relates to her relationship and moving to a healing process. She identified that she needs different things from her various support people in this new stage. She noted a need to have structure to her days and taking things day by day. Things that have helped her cope include discussions with children, structure, being involved at ministry. She processed the holiday season as a newly  individual.    Response to Intervention:  Herminia was very open and process oriented.    Assignment:  Journal about self-care and identity. Use safety mantra    Diagnosis:  Visit Diagnosis, Associated Orders, and Comments     ICD-10-CM    1. Adjustment disorder with depressed mood F43.21 Individual Psychotherapy (53+ min) [[id number]]              Plan / Need for Future Services:  Return for therapy in 2 weeks.     Diane Menendez, ELIJAH

## 2019-01-08 ENCOUNTER — OFFICE VISIT (OUTPATIENT)
Dept: OTHER | Facility: OUTPATIENT CENTER | Age: 64
End: 2019-01-08
Payer: COMMERCIAL

## 2019-01-08 DIAGNOSIS — F43.21 ADJUSTMENT DISORDER WITH DEPRESSED MOOD: Primary | ICD-10-CM

## 2019-01-09 NOTE — PROGRESS NOTES
"               [facility] -  Case Progress Note    Date of Service: Jan 8, 2019  Client Name: Herminia Garcia  YOB: 1955  MRN:  [id number]  Treating Provider: Diane Menendez, PhD, [facility]  Type of Session: Individual  Present in Session: Herminia  Number of Minutes:  55  DA & tx plan: 8/9/18; yearly tx plan    Health Maintenance Summary - Mental Health Treatment Plan       Status Date      Mental Health Tx Plan Q11 MOS Next Due 7/9/2019      Done 8/9/2018      Done 8/28/2017         Current Symptoms/Status:  Herminia expresses marked distress over 's cross-dressing and an inability to address it directly with her . Symptoms include: Anhedonia, depressed mood, fatigue, lower self-worth  Progress Toward Treatment Goals:     Goals include adjust to changes related to Trenton's recent expressed interest in identifying as transgender and marital separation.    Intervention: Modality and Description:  Interpersonal therapy, stress management. Herminia discussed her mood swings following sharing information about her marriage with others. She discussed the feeling of being vulnerable and burdensome. We addressed need for social support, and what types of support she needs specifically. She identified a pattern of pulling away from people during times of stress and made a commitment to herself not to do so at this time. She also identified unhelpful thoughts about she \"should\" have been able to see that Trenton was struggling with gender concerns during their marriage.    Response to Intervention:  Herminia was very open and process oriented.    Assignment:  Journal about self-care and identity. Use safety mantra    Diagnosis:  Visit Diagnosis, Associated Orders, and Comments     ICD-10-CM    1. Adjustment disorder with depressed mood F43.21 Individual Psychotherapy (53+ min) [[id number]]              Plan / Need for Future Services:  Return for therapy in 2 weeks.     Diane Menendez, ELIJAH      "

## 2019-01-21 ENCOUNTER — TELEPHONE (OUTPATIENT)
Dept: OTHER | Facility: OUTPATIENT CENTER | Age: 64
End: 2019-01-21

## 2019-02-05 ENCOUNTER — OFFICE VISIT (OUTPATIENT)
Dept: OTHER | Facility: OUTPATIENT CENTER | Age: 64
End: 2019-02-05
Payer: COMMERCIAL

## 2019-02-05 DIAGNOSIS — F43.21 ADJUSTMENT DISORDER WITH DEPRESSED MOOD: Primary | ICD-10-CM

## 2019-02-06 NOTE — PROGRESS NOTES
[facility] -  Case Progress Note    Date of Service: Feb 5, 2019  Client Name: Herminia Garcia  YOB: 1955  MRN:  [id number]  Treating Provider: Diane Menendez, PhD, [facility]  Type of Session: Individual  Present in Session: Herminia  Number of Minutes:  50  DA & tx plan: 8/9/18; yearly tx plan    Health Maintenance Summary - Mental Health Treatment Plan       Status Date      Mental Health Tx Plan Q11 MOS Next Due 7/9/2019      Done 8/9/2018      Done 8/28/2017         Current Symptoms/Status:  Herminia expresses marked distress over 's cross-dressing and an inability to address it directly with her . Symptoms include: Anhedonia, depressed mood, fatigue, lower self-worth    Progress Toward Treatment Goals:     Goals include adjust to changes related to Don's recent expressed interest in identifying as transgender and marital separation.    Intervention: Modality and Description:  Client 10 minutes late due to weather/traffic.   Interpersonal therapy, stress management. Client processed feelings of loneliness and aloneness as a result of divorce, reason for divorce, and chronic health conditions. She discussed how this impacts her. She also addressed feeling she has not processed her previous trauma and finds this is cropping up in her life, for example feeling despondent when learning of someone else's trauma. We discussed possible referral to EMDR therapist and she will think about this.     Response to Intervention:  Herminia was very open and process oriented.    Assignment:  Journal about self-care and identity. Use safety mantra    Diagnosis:  Visit Diagnosis, Associated Orders, and Comments     ICD-10-CM    1. Adjustment disorder with depressed mood F43.21 Individual Psychotherapy (38-52 min) [[id number]]              Plan / Need for Future Services:  Return for therapy in 2 weeks.     Diane Menendez, ELIJAH

## 2019-02-19 ENCOUNTER — OFFICE VISIT (OUTPATIENT)
Dept: OTHER | Facility: OUTPATIENT CENTER | Age: 64
End: 2019-02-19
Payer: COMMERCIAL

## 2019-02-19 DIAGNOSIS — F43.21 ADJUSTMENT DISORDER WITH DEPRESSED MOOD: Primary | ICD-10-CM

## 2019-02-21 NOTE — PROGRESS NOTES
[facility] -  Case Progress Note    Date of Service: Feb 19, 2019  Client Name: Herminia Garcia  YOB: 1955  MRN:  [id number]  Treating Provider: Diane Menendez, PhD, [facility]  Type of Session: Individual  Present in Session: Herminia  Number of Minutes:  60  DA & tx plan: 8/9/18; yearly tx plan    Health Maintenance Summary - Mental Health Treatment Plan       Status Date      Mental Health Tx Plan Q11 MOS Next Due 7/9/2019      Done 8/9/2018      Done 8/28/2017         Current Symptoms/Status:  Herminia expresses marked distress over 's cross-dressing and an inability to address it directly with her . Symptoms include: Anhedonia, depressed mood, fatigue, lower self-worth    Progress Toward Treatment Goals:     Goals include adjust to changes related to Don's recent expressed interest in identifying as transgender and marital separation.    Intervention: Modality and Description:    Interpersonal therapy, stress management. Client processed feelings related to lack of support during her marital separation because of people's desire to support . She noted she does not want to deny him support, but does not want to be the conduit for that support. She processed changes in family dynamics and how to manage taking care of herself first before children. She stated that her daughter revealed she had moved into an apartment last Fall and had not told Herminia. She was able to conceal this because of their different schedules. Client noted improvement in mood and anxiety, but continued distress around separation and self-identity.     Response to Intervention:  Herminia was very open and process oriented.    Assignment:  Journal about self-care and identity. Use safety mantra    Diagnosis:  Visit Diagnosis, Associated Orders, and Comments     ICD-10-CM    1. Adjustment disorder with depressed mood F43.21 Individual Psychotherapy (53+ min) [[id number]]              Plan /  Need for Future Services:  Return for therapy in 2 weeks.     Diane Menendez LMFT

## 2019-03-06 ENCOUNTER — OFFICE VISIT (OUTPATIENT)
Dept: OTHER | Facility: OUTPATIENT CENTER | Age: 64
End: 2019-03-06
Payer: COMMERCIAL

## 2019-03-06 DIAGNOSIS — F43.21 ADJUSTMENT DISORDER WITH DEPRESSED MOOD: Primary | ICD-10-CM

## 2019-03-11 NOTE — PROGRESS NOTES
"               [facility] -  Case Progress Note    Date of Service: March 6, 2019  Client Name: Herminia Garcia  YOB: 1955  MRN:  [id number]  Treating Provider: Diane Menendez, PhD, [facility]  Type of Session: Individual  Present in Session: Herminia  Number of Minutes:  60  DA & tx plan: 8/9/18; yearly tx plan    Health Maintenance Summary - Mental Health Treatment Plan       Status Date      Mental Health Tx Plan Q11 MOS Next Due 7/9/2019      Done 8/9/2018      Done 8/28/2017         Current Symptoms/Status:  Herminia expresses marked distress over 's cross-dressing and an inability to address it directly with her . Symptoms include: Anhedonia, depressed mood, fatigue, lower self-worth    Progress Toward Treatment Goals:     Goals include adjust to changes related to Don's recent expressed interest in identifying as transgender and marital separation.    Intervention: Modality and Description:    Interpersonal therapy, stress management. Client processed their lack of comfort with feelings and sharing feelings with others due to messages from family of origin about being happy. She related some of these to her brother who committed suicide as he often bullied her and younger siblings when he was depressed. She discussed \"breaking down\" in front of her niece who had turned to her for support with an eating disorder. She was challenged on the belief that she should be there for others without being vulnerable herself.     Response to Intervention:  Herminia was very open and process oriented.    Assignment:  Journal about self-care and identity. Use safety mantra    Diagnosis:  Visit Diagnosis, Associated Orders, and Comments     ICD-10-CM    1. Adjustment disorder with depressed mood F43.21 Individual Psychotherapy (53+ min) [[id number]]              Plan / Need for Future Services:  Return for therapy in 2 weeks.     Diane Menendez, ELIJAH      "

## 2019-03-19 ENCOUNTER — OFFICE VISIT (OUTPATIENT)
Dept: OTHER | Facility: OUTPATIENT CENTER | Age: 64
End: 2019-03-19
Payer: COMMERCIAL

## 2019-03-19 DIAGNOSIS — F43.21 ADJUSTMENT DISORDER WITH DEPRESSED MOOD: Primary | ICD-10-CM

## 2019-03-19 NOTE — PROGRESS NOTES
[facility] -  Case Progress Note    Date of Service: March 19, 2019  Client Name: Herminia Garcia  YOB: 1955  MRN:  [id number]  Treating Provider: Diane Menendez, PhD, [facility]  Type of Session: Individual  Present in Session: Herminia  Number of Minutes:  60  DA & tx plan: 8/9/18; yearly tx plan    Health Maintenance Summary - Mental Health Treatment Plan       Status Date      Mental Health Tx Plan Q11 MOS Next Due 7/9/2019      Done 8/9/2018      Done 8/28/2017         Current Symptoms/Status:  Herminia expresses marked distress over 's cross-dressing and an inability to address it directly with her . Symptoms include: Anhedonia, depressed mood, fatigue, lower self-worth    Progress Toward Treatment Goals:     Goals include adjust to changes related to Don's recent expressed interest in identifying as transgender and marital separation.    Intervention: Modality and Description:    Interpersonal therapy, stress management. Client reported that  has come out at work. She noted that this creates a situation in which she can now be more open, including at her workspace. She described difficulty making decision about what to share at work due to perceived trans and homophobia in leader of her unit. However, her unit also interacts with 's firm. Came up with plan that she will discuss with coworker and possibly gender/women's  and equal opportunity office. Client noted that she has made a helpful shift since last session towards identifying her need for emotional response when other party may not process this way, but instead want to impart information. She noted this has helped in discussions with sisters.     Response to Intervention:  Herminia was very open and process oriented.    Assignment:  Journal about self-care and identity. Use safety mantra    Diagnosis:  Visit Diagnosis, Associated Orders, and Comments     ICD-10-CM    1.  Adjustment disorder with depressed mood F43.21 Individual Psychotherapy (53+ min) [[id number]]              Plan / Need for Future Services:  Return for therapy in 2 weeks.     Diane Menendez LMFT

## 2019-04-03 ENCOUNTER — OFFICE VISIT (OUTPATIENT)
Dept: OTHER | Facility: OUTPATIENT CENTER | Age: 64
End: 2019-04-03
Payer: COMMERCIAL

## 2019-04-03 DIAGNOSIS — F43.21 ADJUSTMENT DISORDER WITH DEPRESSED MOOD: Primary | ICD-10-CM

## 2019-04-07 NOTE — PROGRESS NOTES
[facility] -  Case Progress Note    Date of Service: April 3, 2019  Client Name: Herminia Garcia  YOB: 1955  MRN:  [id number]  Treating Provider: Diane Menendez, PhD, [facility]  Type of Session: Individual  Present in Session: Herminia  Number of Minutes:  60  DA & tx plan: 8/9/18; yearly tx plan    Health Maintenance Summary - Mental Health Treatment Plan       Status Date      Mental Health Tx Plan Q11 MOS Next Due 7/9/2019      Done 8/9/2018      Done 8/28/2017         Current Symptoms/Status:  Herminia expresses marked distress over 's cross-dressing and an inability to address it directly with her . Symptoms include: Anhedonia, depressed mood, fatigue, lower self-worth    Progress Toward Treatment Goals:     Goals include adjust to changes related to Don's recent expressed interest in identifying as transgender and marital separation.    Intervention: Modality and Description:    Interpersonal therapy, stress management. Client discussed distress related to realization that work is not a safe place to disclose spouse's gender identity. She noted that this deflated her sense of relief after spouse came out at own workplace. Client also discussed changes in family structure and how that can lead to sense of loneliness. Client indicated a need for further social support.    Response to Intervention:  Herminia was very open and process oriented.    Assignment:  Journal about self-care and identity. Use safety mantra    Diagnosis:  Visit Diagnosis, Associated Orders, and Comments     ICD-10-CM    1. Adjustment disorder with depressed mood F43.21 Individual Psychotherapy (53+ min) [[id number]]              Plan / Need for Future Services:  Return for therapy in 2 weeks.     ELIJAH Velez

## 2019-04-24 ENCOUNTER — OFFICE VISIT (OUTPATIENT)
Dept: OTHER | Facility: OUTPATIENT CENTER | Age: 64
End: 2019-04-24
Payer: COMMERCIAL

## 2019-04-24 DIAGNOSIS — F43.21 ADJUSTMENT DISORDER WITH DEPRESSED MOOD: Primary | ICD-10-CM

## 2019-04-24 NOTE — PROGRESS NOTES
[facility] -  Case Progress Note    Date of Service: April 24, 2019  Client Name: Herminia aGrcia  YOB: 1955  MRN:  [id number]  Treating Provider: Diane Menendez, PhD, LM  Type of Session: Individual  Present in Session: Herminia  Number of Minutes:  60  DA & tx plan: 8/9/18; yearly tx plan    Health Maintenance Summary - Mental Health Treatment Plan       Status Date      Mental Health Tx Plan Q11 MOS Next Due 7/9/2019      Done 8/9/2018      Done 8/28/2017         Current Symptoms/Status:  Herminia expresses marked distress over 's cross-dressing and an inability to address it directly with her . Symptoms include: Anhedonia, depressed mood, fatigue, lower self-worth    Progress Toward Treatment Goals:     Goals include adjust to changes related to Don's recent expressed interest in identifying as transgender and marital separation.    Intervention: Modality and Description:    Interpersonal therapy, stress management. Client discussed sadness related to not feeling a part of a family. We explored how she experiences family, noting differences between how she grew up and her current situation. Encouraged her to continue writing down feelings. She also discussed being overwhelmed by her house and wanting to make it her own in order to help move on to next stage of life. Discussed how to implement mindfulness into her housework and organizing house. She discussed lack of trust in others and we will return to this in future sessions. She also noted anger at spouse about how things have been handled throughout their marriage and this was reframed as finding ways to protect oneself from feeling guilty about decisions she has made.    Response to Intervention:  Herminia was very open and process oriented.    Assignment:  Journal about self-care and identity. Use safety mantra    Diagnosis:  Visit Diagnosis, Associated Orders, and Comments     ICD-10-CM    1.  Adjustment disorder with depressed mood F43.21 Individual Psychotherapy (53+ min) [[id number]]              Plan / Need for Future Services:  Return for therapy in 2 weeks.     Diane Menendez LMFT

## 2019-05-07 ENCOUNTER — OFFICE VISIT (OUTPATIENT)
Dept: OTHER | Facility: OUTPATIENT CENTER | Age: 64
End: 2019-05-07
Payer: COMMERCIAL

## 2019-05-07 DIAGNOSIS — F43.21 ADJUSTMENT DISORDER WITH DEPRESSED MOOD: Primary | ICD-10-CM

## 2019-05-10 NOTE — PROGRESS NOTES
[facility] -  Case Progress Note    Date of Service: May 7, 2019  Client Name: Herminia Garcia  YOB: 1955  MRN:  [id number]  Treating Provider: Diane Menendez, PhD, LMFT  Type of Session: Individual  Present in Session: Herminia  Number of Minutes:  55  DA & tx plan: 8/9/18; yearly tx plan    Health Maintenance Summary - Mental Health Treatment Plan       Status Date      Mental Health Tx Plan Q11 MOS Next Due 7/9/2019      Done 8/9/2018      Done 8/28/2017         Current Symptoms/Status:  Herminia expresses marked distress over 's cross-dressing and an inability to address it directly with her . Symptoms include: Anhedonia, depressed mood, fatigue, lower self-worth    Progress Toward Treatment Goals:     Goals include adjust to changes related to Don's recent expressed interest in identifying as transgender and marital separation.    Intervention: Modality and Description:    Interpersonal therapy, stress management. Client discussed trust/mistrust as it relates to her feeling chaotic and misunderstood. She discussed wanting to take a break from meditation due to many scripts seem focused on fixing or achieving a perfect life. This creates further distance for her from others. She noted wanting people to share an understanding of her losses, and loss of identity as a  woman. She expressed fear about changes in her life and uncertainty about how to manage finances. She also discussed needing further grounding through physical changes to her environment, such as organizational methods. She discussed how her family of origin was chaotic and she often found herself looking for stability in her life.    Response to Intervention:  Herminia was very open and process oriented.    Assignment:  Journal about self-care and identity. Use safety mantra    Diagnosis:  Visit Diagnosis, Associated Orders, and Comments     ICD-10-CM    1. Adjustment disorder with  depressed mood F43.21 Individual Psychotherapy (53+ min) [[id number]]              Plan / Need for Future Services:  Return for therapy in 2 weeks.     Diane Menendez LMFT

## 2019-05-21 ENCOUNTER — OFFICE VISIT (OUTPATIENT)
Dept: OTHER | Facility: OUTPATIENT CENTER | Age: 64
End: 2019-05-21
Payer: COMMERCIAL

## 2019-05-21 DIAGNOSIS — F43.21 ADJUSTMENT DISORDER WITH DEPRESSED MOOD: Primary | ICD-10-CM

## 2019-05-25 NOTE — PROGRESS NOTES
[facility] -  Case Progress Note    Date of Service: May 21, 2019  Client Name: Herminia Garcia  YOB: 1955  MRN:  [id number]  Treating Provider: Diane Menendez, PhD, [facility]  Type of Session: Individual  Present in Session: Herminia  Number of Minutes:  55  DA & tx plan: 8/9/18; yearly tx plan    Health Maintenance Summary - Mental Health Treatment Plan       Status Date      MENTAL HEALTH TX PLAN Next Due 7/9/2019      Done 8/9/2018      Done 8/28/2017         Current Symptoms/Status:  Herminia expresses marked distress over 's cross-dressing and an inability to address it directly with her . Symptoms include: Anhedonia, depressed mood, fatigue, lower self-worth    Progress Toward Treatment Goals:     Goals include adjust to changes related to Don's recent expressed interest in identifying as transgender and marital separation.    Intervention: Modality and Description:    Interpersonal therapy, stress management. Client processed feelings related to her marital separation and how she relates to her former spouse. She was validated in her anger and sadness at these changes. She discussed processes that no longer work for her in regards to stress management (e.g., meditation) and the need to find alternative coping skills. We discussed social support, smart goals, organizational systems.    Response to Intervention:  Herminia was very open and process oriented.    Assignment:  Journal about self-care and identity. Use safety mantra    Diagnosis:  Visit Diagnosis, Associated Orders, and Comments     ICD-10-CM    1. Adjustment disorder with depressed mood F43.21 Individual Psychotherapy (53+ min) [20659]              Plan / Need for Future Services:  Return for therapy in 2 weeks.     Diane Menendez, ELIJAH

## 2019-06-12 ENCOUNTER — OFFICE VISIT (OUTPATIENT)
Dept: OTHER | Facility: OUTPATIENT CENTER | Age: 64
End: 2019-06-12
Payer: COMMERCIAL

## 2019-06-12 DIAGNOSIS — F43.21 ADJUSTMENT DISORDER WITH DEPRESSED MOOD: Primary | ICD-10-CM

## 2019-06-12 NOTE — PROGRESS NOTES
[facility] -  Case Progress Note    Date of Service: June 12, 2019  Client Name: Herminia Garcia  YOB: 1955  MRN:  [id number]  Treating Provider: Diane Menendez, PhD, [facility]  Type of Session: Individual  Present in Session: Herminia  Number of Minutes:  55  DA & tx plan: 8/9/18; yearly tx plan    Health Maintenance Summary - Mental Health Treatment Plan       Status Date      MENTAL HEALTH TX PLAN Next Due 7/9/2019      Done 8/9/2018      Done 8/28/2017         Current Symptoms/Status:  Herminia expresses marked distress over 's cross-dressing and an inability to address it directly with her . Symptoms include: Anhedonia, depressed mood, fatigue, lower self-worth    Progress Toward Treatment Goals:     Goals include adjust to changes related to Don's recent expressed interest in identifying as transgender and marital separation.    Intervention: Modality and Description:    Interpersonal therapy, stress management. Client processed feelings related to decisions she needs to make about her living environment. She noted that she had bounced around depending on who she talked to and what advice she received. We discussed tuning into her emotional reactions to visualizations of different scenarios. Herminia also discussed the death of a friend and the many losses and changes to her social life and community - noting that she doesn't have the same community as in the past and unsure how to recreate this sense.    Response to Intervention:  Herminia was very open and process oriented.    Assignment:  Journal about self-care and identity. Use safety mantra    Diagnosis:  Visit Diagnosis, Associated Orders, and Comments     ICD-10-CM    1. Adjustment disorder with depressed mood F43.21 Individual Psychotherapy (53+ min) [42177]              Plan / Need for Future Services:  Return for therapy in 2 weeks.     Diane Menendez, ELIJAH

## 2019-06-17 ENCOUNTER — OFFICE VISIT (OUTPATIENT)
Dept: OTHER | Facility: OUTPATIENT CENTER | Age: 64
End: 2019-06-17
Payer: COMMERCIAL

## 2019-06-17 DIAGNOSIS — F43.21 ADJUSTMENT DISORDER WITH DEPRESSED MOOD: Primary | ICD-10-CM

## 2019-06-18 NOTE — PROGRESS NOTES
"               [facility] -  Case Progress Note    Date of Service: June 17, 2019  Client Name: Herminia Garcia  YOB: 1955  MRN:  [id number]  Treating Provider: Diane Menendez, PhD, [facility]  Type of Session: Individual  Present in Session: Herminia  Number of Minutes:  55  DA & tx plan: 8/9/18; yearly tx plan    Health Maintenance Summary - Mental Health Treatment Plan       Status Date      MENTAL HEALTH TX PLAN Next Due 7/9/2019      Done 8/9/2018      Done 8/28/2017         Current Symptoms/Status:  Herminia expresses marked distress over spouse's transition. Symptoms include: Anhedonia, depressed mood, fatigue    Progress Toward Treatment Goals:     Goals include adjust to changes related to spouse's recent expressed interest in identifying as transgender and marital separation.    Intervention: Modality and Description:    Interpersonal therapy, stress management. Client called for an emergency appointment. She found out that her spouse will be sending a letter through work to all clients, including Herminia's office, letting people know about transition. Herminia showed me the letter. She had concerns about how to discuss this at work without becoming emotional and crying. We processed strategies, including reframing her choice to not discuss as being emotional about marital dissolution versus lack of support for spouse (now using name Nohemi). She shared a text she sent to siblings explaining her loneliness in the process, and her closest sibling wrote back \"wonderful, will Don be using she/her pronouns?\". This was upsetting to Herminia, noting that even her closest allies focus on the positives for Nohemi versus the sadness Herminia is feeling. We discussed having ways to share her sadness without taking away support from Nohemi.     Response to Intervention:  Herminia was very open and process oriented.    Assignment:  Journal about self-care and identity. Use safety mantra    Diagnosis:  Visit " Diagnosis, Associated Orders, and Comments     ICD-10-CM    1. Adjustment disorder with depressed mood F43.21 Individual Psychotherapy (53+ min) [03529]              Plan / Need for Future Services:  Return for therapy in 2 weeks.     Diane Menendez LMFT

## 2019-06-26 ENCOUNTER — OFFICE VISIT (OUTPATIENT)
Dept: OTHER | Facility: OUTPATIENT CENTER | Age: 64
End: 2019-06-26
Payer: COMMERCIAL

## 2019-06-26 DIAGNOSIS — F43.21 ADJUSTMENT DISORDER WITH DEPRESSED MOOD: Primary | ICD-10-CM

## 2019-06-29 NOTE — PROGRESS NOTES
[facility] -  Case Progress Note    Date of Service: June 26, 2019  Client Name: Herminia Garcia  YOB: 1955  MRN:  [id number]  Treating Provider: Diane Menendez, PhD, LM  Type of Session: Individual  Present in Session: Herminia  Number of Minutes:  55  DA & tx plan: 8/9/18; yearly tx plan    Health Maintenance Summary - Mental Health Treatment Plan       Status Date      MENTAL HEALTH TX PLAN Next Due 7/9/2019      Done 8/9/2018      Done 8/28/2017         Current Symptoms/Status:  Herminia expresses marked distress over spouse's transition. Symptoms include: Anhedonia, depressed mood, fatigue    Progress Toward Treatment Goals:     Goals include adjust to changes related to spouse's recent expressed interest in identifying as transgender and marital separation.    Intervention: Modality and Description:    Interpersonal therapy, stress management. Client discussed the regression in symptoms this past week. Discussed the monumental events that happened over the week, noting that this will not be the typical course. Discussed possible other large events that may still be in her future. Client also stated she wants to not live her life around the events in her spouse's life and we discussed how to make that happen. She stated that speaking to her supervisor went fine and hopes to not have to have many more conversations with coworkers. We discussed endings and how she typically manages endings. She noted the endings happening now and recently and the sadness she feels.    Response to Intervention:  Herminia was very open and process oriented.    Assignment:  Journal about self-care and identity. Use safety mantra    Diagnosis:  Visit Diagnosis, Associated Orders, and Comments     ICD-10-CM    1. Adjustment disorder with depressed mood F43.21 Individual Psychotherapy (53+ min) [[id number]]              Plan / Need for Future Services:  Return for therapy in 2 weeks.     Diane  ELIJAH Temple

## 2019-07-10 ENCOUNTER — OFFICE VISIT (OUTPATIENT)
Dept: OTHER | Facility: OUTPATIENT CENTER | Age: 64
End: 2019-07-10
Payer: COMMERCIAL

## 2019-07-10 DIAGNOSIS — F43.21 ADJUSTMENT DISORDER WITH DEPRESSED MOOD: Primary | ICD-10-CM

## 2019-07-11 NOTE — PROGRESS NOTES
[facility] -  Case Progress Note    Date of Service: July 10, 2019  Client Name: Herminia Garcia  YOB: 1955  MRN:  [id number]  Treating Provider: Diane Menendez, PhD, [facility]  Type of Session: Individual  Present in Session: Herminia  Number of Minutes:  55  DA & tx plan: 8/9/18; yearly tx plan    Health Maintenance Summary - Mental Health Treatment Plan       Status Date      MENTAL HEALTH TX PLAN Overdue 7/9/2019      Done 8/9/2018      Done 8/28/2017       Treatment plan will be done at same time as DA in August.    Current Symptoms/Status:  Herminia expresses marked distress over spouse's transition. Symptoms include: Anhedonia, depressed mood, fatigue    Progress Toward Treatment Goals:     Goals include adjust to changes related to spouse's recent expressed interest in identifying as transgender and marital separation.    Intervention: Modality and Description:    Interpersonal therapy, stress management. Client discussed the distress she experienced recently and the losses she has felt due to having to learn how to be single after decades of marriage. She discussed how to disentangle work and personal life so that work will not be negatively impacted. She noted that conversations with supervisors have gone well to neutral on the subject. We discussed a pattern of her being more attentive to others' feelings than her own, including acquaintances and her children. We will return to developing stronger relationships with her children in future sessions. Introduced the idea of being able to ask for help from adult children (e.g., help move heavy items) and others.      Response to Intervention:  Hermniia was very open and process oriented.    Assignment:  Journal about self-care and identity. Use safety mantra    Diagnosis:  Visit Diagnosis, Associated Orders, and Comments     ICD-10-CM    1. Adjustment disorder with depressed mood F43.21 Individual Psychotherapy (53+ min)  [[id number]]              Plan / Need for Future Services:  Return for therapy in 2 weeks.     Diane Menendez LMFT

## 2019-07-30 ENCOUNTER — OFFICE VISIT (OUTPATIENT)
Dept: OTHER | Facility: OUTPATIENT CENTER | Age: 64
End: 2019-07-30
Payer: COMMERCIAL

## 2019-07-30 DIAGNOSIS — F43.21 ADJUSTMENT DISORDER WITH DEPRESSED MOOD: Primary | ICD-10-CM

## 2019-07-31 NOTE — PROGRESS NOTES
[facility] -  Case Progress Note    Date of Service: July 30, 2019  Client Name: Herminia Garcia  YOB: 1955  MRN:  [id number]  Treating Provider: Diane Menendez, PhD, [facility]  Type of Session: Individual  Present in Session: Herminia  Number of Minutes:  55  DA & tx plan: 8/9/18; yearly tx plan    Health Maintenance Summary - Mental Health Treatment Plan       Status Date      MENTAL HEALTH TX PLAN Overdue 7/9/2019      Done 8/9/2018      Done 8/28/2017       Treatment plan will be done at same time as DA in August.    Current Symptoms/Status:  Herminia expresses marked distress over spouse's transition. Symptoms include: Anhedonia, depressed mood, fatigue    Progress Toward Treatment Goals:     Goals include adjust to changes related to spouse's recent expressed interest in identifying as transgender and marital separation.    Intervention: Modality and Description:    Interpersonal therapy, stress management. Client discussed work related stress as it relates to her marital separation and spouse's transition. She stated that a coworker had suggested she talk to the [employer] office, but she did not feel it warranted this. She was advised to document any concerns should she need to contact [employer] in the future. She discussed reaching out for social support, noting that she is improving in her ability to reach out. Upon questioning, it became clear she is unsure what she needs/wants out of others, but she is becoming more clear on what she doesn't want. During next session we will address upcoming family trip. She expressed some trepidation about sister who is a trans advocate.       Response to Intervention:  Herminia was very open and process oriented.    Assignment:  Journal about self-care and identity. Use safety mantra    Diagnosis:  Visit Diagnosis, Associated Orders, and Comments     ICD-10-CM    1. Adjustment disorder with depressed mood F43.21  Individual Psychotherapy (53+ min) [[id number]]              Plan / Need for Future Services:  Return for therapy in 2 weeks.     Diane Menendez LMFT

## 2019-08-07 ENCOUNTER — OFFICE VISIT (OUTPATIENT)
Dept: OTHER | Facility: OUTPATIENT CENTER | Age: 64
End: 2019-08-07
Payer: COMMERCIAL

## 2019-08-07 DIAGNOSIS — F43.21 ADJUSTMENT DISORDER WITH DEPRESSED MOOD: Primary | ICD-10-CM

## 2019-08-08 NOTE — PROGRESS NOTES
[facility] -  Case Progress Note    Date of Service: August 7, 2019  Client Name: Herminia Garcia  YOB: 1955  MRN:  [id number]  Treating Provider: Diane Menendez, PhD, [facility]  Type of Session: Individual  Present in Session: Herminia  Number of Minutes:  55  DA & tx plan: 8/9/18; yearly tx plan    Health Maintenance Summary - Mental Health Treatment Plan       Status Date      MENTAL HEALTH TX PLAN Overdue 7/9/2019      Done 8/9/2018      Done 8/28/2017       Treatment plan will be done at same time as DA in August.    Current Symptoms/Status:  Herminia expresses marked distress over spouse's transition. Symptoms include: Anhedonia, depressed mood, fatigue    Progress Toward Treatment Goals:     Goals include adjust to changes related to spouse's recent expressed interest in identifying as transgender and marital separation.    Intervention: Modality and Description:    Interpersonal therapy, stress management. Client discussed upcoming family vacation and how to manage discussions about divorce with family members. We processed ideas to minimize stress and increase mindfulness while on vacation. She also expressed distress and uncertainty about spouse changing name legally. We discussed this process and how it may or may not have an impact on client's life. She noted ongoing stress when faced with decisions made by spouse. She was challenged to focus on her future.     Response to Intervention:  Herminia was very open and process oriented.    Assignment:  Journal about self-care and identity. Use safety mantra    Diagnosis:  Visit Diagnosis, Associated Orders, and Comments     ICD-10-CM    1. Adjustment disorder with depressed mood F43.21 Individual Psychotherapy (53+ min) [[id number]]              Plan / Need for Future Services:  Return for therapy in 2 weeks.     Diane Menendez, ELIJAH

## 2019-08-27 ENCOUNTER — OFFICE VISIT (OUTPATIENT)
Dept: OTHER | Facility: OUTPATIENT CENTER | Age: 64
End: 2019-08-27
Payer: COMMERCIAL

## 2019-08-27 DIAGNOSIS — F43.21 ADJUSTMENT DISORDER WITH DEPRESSED MOOD: Primary | ICD-10-CM

## 2019-08-27 ASSESSMENT — ANXIETY QUESTIONNAIRES
1. FEELING NERVOUS, ANXIOUS, OR ON EDGE: SEVERAL DAYS
3. WORRYING TOO MUCH ABOUT DIFFERENT THINGS: MORE THAN HALF THE DAYS
6. BECOMING EASILY ANNOYED OR IRRITABLE: SEVERAL DAYS
GAD7 TOTAL SCORE: 7
2. NOT BEING ABLE TO STOP OR CONTROL WORRYING: SEVERAL DAYS
7. FEELING AFRAID AS IF SOMETHING AWFUL MIGHT HAPPEN: SEVERAL DAYS
5. BEING SO RESTLESS THAT IT IS HARD TO SIT STILL: NOT AT ALL

## 2019-08-27 ASSESSMENT — PATIENT HEALTH QUESTIONNAIRE - PHQ9
5. POOR APPETITE OR OVEREATING: SEVERAL DAYS
SUM OF ALL RESPONSES TO PHQ QUESTIONS 1-9: 5
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
## 2019-08-28 ASSESSMENT — ANXIETY QUESTIONNAIRES: GAD7 TOTAL SCORE: 7

## 2019-09-11 ENCOUNTER — OFFICE VISIT (OUTPATIENT)
Dept: OTHER | Facility: OUTPATIENT CENTER | Age: 64
End: 2019-09-11
Payer: COMMERCIAL

## 2019-09-11 DIAGNOSIS — F43.21 ADJUSTMENT DISORDER WITH DEPRESSED MOOD: Primary | ICD-10-CM
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
## 2019-09-25 ENCOUNTER — OFFICE VISIT (OUTPATIENT)
Dept: OTHER | Facility: OUTPATIENT CENTER | Age: 64
End: 2019-09-25
Payer: COMMERCIAL

## 2019-09-25 DIAGNOSIS — F43.21 ADJUSTMENT DISORDER WITH DEPRESSED MOOD: Primary | ICD-10-CM
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
## 2019-10-09 ENCOUNTER — OFFICE VISIT (OUTPATIENT)
Dept: OTHER | Facility: OUTPATIENT CENTER | Age: 64
End: 2019-10-09
Payer: COMMERCIAL

## 2019-10-09 DIAGNOSIS — F43.22 ADJUSTMENT DISORDER WITH ANXIETY: Primary | ICD-10-CM

## 2019-10-09 NOTE — PROGRESS NOTES
[facility] -  Case Progress Note    Date of Service: October 9, 2019  Client Name: Herminia Garcia  YOB: 1955  MRN:  [id number]  Treating Provider: Diane Menendez, PhD, [facility]  Type of Session: Individual  Present in Session: Herminia  Number of Minutes:  55  DA & tx plan: 8/27/19; yearly tx plan    Health Maintenance Summary - Mental Health Treatment Plan       Status Date      MENTAL HEALTH TX PLAN Next Due 7/27/2020      Done 8/27/2019 [facility] MENTAL HEALTH TX PLAN SCAN     Done 8/9/2018      Done 8/28/2017           Current Symptoms/Status:  Herminia expresses marked distress over spouse's transition. Symptoms include: Intermittent sadness, anxiety, stress, difficulty concentrating, regarding stressors of spouse's transition and marital separation    Progress Toward Treatment Goals:     Goals include adjust to changes related to spouse's recent expressed interest in identifying as transgender and marital separation.    Intervention: Modality and Description:    Interpersonal therapy, stress management. Herminia processed her difficulty making decisions on her own now that she is . She processed family of origin contributions, marriage history, and personality all coming together to make it difficult to settle on a decision without having someone to discuss it with. She also discussed perceived judgements based on past two years and discussed how her perception has changed. She also expressed concern about her finances, in particular due to home and car repairs.     Response to Intervention:  Herminia was very open and process oriented.    Assignment:  Journal about self-care and identity. Use safety mantra    Diagnosis:  Visit Diagnosis, Associated Orders, and Comments     ICD-10-CM    1. Adjustment disorder with depressed mood F43.21 Individual Psychotherapy (53+ min) [[id number]]              Plan / Need for Future Services:  Return for therapy in 2 weeks.     Diane DENNISON  ELIJAH Menendez

## 2019-10-23 ENCOUNTER — OFFICE VISIT (OUTPATIENT)
Dept: OTHER | Facility: OUTPATIENT CENTER | Age: 64
End: 2019-10-23
Payer: COMMERCIAL

## 2019-10-23 DIAGNOSIS — F43.22 ADJUSTMENT DISORDER WITH ANXIETY: Primary | ICD-10-CM

## 2019-10-24 NOTE — PROGRESS NOTES
[facility] -  Case Progress Note    Date of Service: October 23, 2019  Client Name: Herminia Garcia  YOB: 1955  MRN:  [id number]  Treating Provider: Diane Menendez, PhD, LM  Type of Session: Individual  Present in Session: Herminia  Number of Minutes:  55  DA & tx plan: 8/27/19; yearly tx plan    Health Maintenance Summary - Mental Health Treatment Plan       Status Date      MENTAL HEALTH TX PLAN Next Due 7/27/2020      Done 8/27/2019 [facility] MENTAL HEALTH TX PLAN SCAN     Done 8/9/2018      Done 8/28/2017           Current Symptoms/Status:  Herminia expresses marked distress over spouse's transition. Symptoms include: Intermittent sadness, anxiety, stress, difficulty concentrating, regarding stressors of spouse's transition and marital separation    Progress Toward Treatment Goals:     Goals include adjust to changes related to spouse's recent expressed interest in identifying as transgender and marital separation.    Intervention: Modality and Description:    Interpersonal therapy, stress management. Herminia discussed her development as a single person and need to rely on self and others (non-spouse). We discussed the role that denial has played in her coping strategies, identifying when it is helpful and when it is not. She questioned if her spouse will get gender confirmation surgery and I provided some information about the steps that need to be involved. She appeared somewhat relieved that it would not be something her spouse could do without thinking things through. She processed her reactions to pronouns and we agreed to use they/them as a step towards helping her hear pronouns other than he/him to describe her spouse.      Response to Intervention:  Herminia was very open and process oriented.    Assignment:  Journal about self-care and identity. Use safety mantra    Diagnosis:  Visit Diagnosis, Associated Orders, and Comments     ICD-10-CM    1. Adjustment disorder with  depressed mood F43.21 Individual Psychotherapy (53+ min) [[id number]]              Plan / Need for Future Services:  Return for therapy in 2 weeks.     Diane Menendez LMFT

## 2019-11-13 ENCOUNTER — OFFICE VISIT (OUTPATIENT)
Dept: OTHER | Facility: OUTPATIENT CENTER | Age: 64
End: 2019-11-13
Payer: COMMERCIAL

## 2019-11-13 DIAGNOSIS — F43.21 ADJUSTMENT DISORDER WITH DEPRESSED MOOD: Primary | ICD-10-CM

## 2019-11-13 NOTE — PROGRESS NOTES
[facility] -  Case Progress Note    Date of Service: Nov 13, 2019  Client Name: Herminia Garcia  YOB: 1955  MRN:  [id number]  Treating Provider: Diane Menendez, PhD, LM  Type of Session: Individual  Present in Session: Herminia  Number of Minutes:  55  DA & tx plan: 8/27/19; yearly tx plan    Health Maintenance Summary - Mental Health Treatment Plan       Status Date      MENTAL HEALTH TX PLAN Next Due 7/27/2020      Done 8/27/2019 [facility] MENTAL HEALTH TX PLAN SCAN     Done 8/9/2018      Done 8/28/2017           Current Symptoms/Status:  Herminia expresses marked distress over spouse's transition. Symptoms include: Intermittent sadness, anxiety, stress, difficulty concentrating, regarding stressors of spouse's transition and marital separation    Progress Toward Treatment Goals:     Goals include adjust to changes related to spouse's recent expressed interest in identifying as transgender and marital separation. Herminia is making small progress as she works towards less rumination.    Intervention: Modality and Description:    Interpersonal therapy, stress management. Herminia noted continued grief and loss over loss of marriage. We discussed how to develop emotional resilience, in both acknowledging her feelings and planning for her future. She noted feeling she is not processing things correctly because she continues to feel sadness. This was challenged due to the multitude of challenges and transitions she has experienced in the last few years. She was also challenged to not wait for acknowledgement from spouse in regards to her feelings, as this appears to keep her stuck. We processed anger that she feels for not having control over how her life is developing and feelings that she was complicit in not acknowledging problems the family was experiencing.      Response to Intervention:  Herminia was very open and process oriented, teary during discussion of losses of family  members.    Assignment:  Journal about self-care and identity. Use safety mantra    Diagnosis:  Visit Diagnosis, Associated Orders, and Comments     ICD-10-CM    1. Adjustment disorder with depressed mood F43.21 Individual Psychotherapy (53+ min) [[id number]]              Plan / Need for Future Services:  Return for therapy in 2 weeks.     Diane Menendez LMFT

## 2019-11-25 ENCOUNTER — OFFICE VISIT (OUTPATIENT)
Dept: OTHER | Facility: OUTPATIENT CENTER | Age: 64
End: 2019-11-25
Payer: COMMERCIAL

## 2019-11-25 DIAGNOSIS — F43.21 ADJUSTMENT DISORDER WITH DEPRESSED MOOD: Primary | ICD-10-CM
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
## 2019-12-11 ENCOUNTER — OFFICE VISIT (OUTPATIENT)
Dept: OTHER | Facility: OUTPATIENT CENTER | Age: 64
End: 2019-12-11
Payer: COMMERCIAL

## 2019-12-11 DIAGNOSIS — F43.21 ADJUSTMENT DISORDER WITH DEPRESSED MOOD: Primary | ICD-10-CM
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
## 2019-12-18 ENCOUNTER — OFFICE VISIT (OUTPATIENT)
Dept: OTHER | Facility: OUTPATIENT CENTER | Age: 64
End: 2019-12-18
Payer: COMMERCIAL

## 2019-12-18 DIAGNOSIS — F43.21 ADJUSTMENT DISORDER WITH DEPRESSED MOOD: Primary | ICD-10-CM

## 2019-12-19 NOTE — PROGRESS NOTES
[facility] -  Case Progress Note    Date of Service: Dec 18, 2019  Client Name: Herminia Garcia  YOB: 1955  MRN:  [id number]  Treating Provider: Diane Menendez, PhD, LMFT  Type of Session: Individual  Present in Session: Herminia  Number of Minutes:  55  DA & tx plan: 19; yearly tx plan    Health Maintenance Summary - Mental Health Treatment Plan       Status Date      MENTAL HEALTH TX PLAN Next Due 2020      Done 2019 [facility] MENTAL HEALTH TX PLAN SCAN     Done 2018      Done 2017           Current Symptoms/Status:  Herminia expresses marked distress over spouse's transition. Symptoms include: Intermittent sadness, anxiety, stress, difficulty concentrating, regarding stressors of spouse's transition and marital separation    Progress Toward Treatment Goals:     Goals include adjust to changes related to spouse's recent expressed interest in identifying as transgender and marital separation. Herminia is making small progress as she works towards less rumination.    Intervention: Modality and Description:    Interpersonal therapy, stress management. Herminia brought in song she found while cleaning that had been sung at her mother's . She processed her losses, in particular her mother, noting that she feels these losses more profoundly at holiday time. She has been attempting to spend time honoring mother, but this has also resulted in increased sadness. Herminia processed feelings of being alone after being in large and then medium sized family her whole life. She cried through much of this session. Space was provided for her to process and hold these emotions. She also described feeling distant from siblings due to differences in grief.    Response to Intervention:  Herminia was very open and process oriented    Assignment:  Journal about self-care and identity. Use safety mantra    Diagnosis:  Visit Diagnosis, Associated Orders, and Comments     ICD-10-CM    1.  Adjustment disorder with depressed mood F43.21 Individual Psychotherapy (53+ min) [[id number]]              Plan / Need for Future Services:  Return for therapy in 2 weeks.     Diane Menendez LMFT

## 2020-01-08 ENCOUNTER — OFFICE VISIT (OUTPATIENT)
Dept: OTHER | Facility: OUTPATIENT CENTER | Age: 65
End: 2020-01-08
Payer: COMMERCIAL

## 2020-01-08 DIAGNOSIS — F43.21 ADJUSTMENT DISORDER WITH DEPRESSED MOOD: Primary | ICD-10-CM

## 2020-01-08 NOTE — PROGRESS NOTES
[facility] -  Case Progress Note    Date of Service: 1/8/2020  Client Name: Herminia Garcia  YOB: 1955  MRN:  [id number]  Treating Provider: Diane Menendez, PhD, [facility]  Type of Session: Individual  Present in Session: Herminia  Number of Minutes:  58  DA & tx plan: 8/27/19; yearly tx plan    Health Maintenance Summary - Mental Health Treatment Plan       Status Date      MENTAL HEALTH TX PLAN Next Due 7/27/2020      Done 8/27/2019 [facility] MENTAL HEALTH TX PLAN SCAN     Done 8/9/2018      Done 8/28/2017           Current Symptoms/Status:  Herminia expresses marked distress over spouse's transition. Symptoms include: Intermittent sadness, anxiety, stress, difficulty concentrating, regarding stressors of spouse's transition and marital separation    Progress Toward Treatment Goals:     Goals include adjust to changes related to spouse's recent expressed interest in identifying as transgender and marital separation. Herminia is making small progress as she works towards less rumination.    Intervention: Modality and Description:    Interpersonal therapy, stress management. Herminia discussed her challenges in building a social network to assist with transitions during marital separation. She discussed perceived judgements from other people and how that is having a negative impact. She noted that her spouse used to help protect her from others' judgements. We discussed means of developing her own buffering effect against perceived and/or real judgements by others. She stated that she reconnected with two other  friends and she was encouraged to continue to do so.    Response to Intervention:  Herminia was very open and process oriented    Assignment:  Journal about self-care and identity. Use safety mantra    Diagnosis:  Visit Diagnosis, Associated Orders, and Comments     ICD-10-CM    1. Adjustment disorder with depressed mood F43.21 Individual Psychotherapy (53+ min) [[id number]]               Plan / Need for Future Services:  Return for therapy in 2 weeks.     Diane Menendez LMFT

## 2020-01-22 ENCOUNTER — OFFICE VISIT (OUTPATIENT)
Dept: OTHER | Facility: OUTPATIENT CENTER | Age: 65
End: 2020-01-22
Payer: COMMERCIAL

## 2020-01-22 DIAGNOSIS — F43.21 ADJUSTMENT DISORDER WITH DEPRESSED MOOD: Primary | ICD-10-CM

## 2020-02-12 ENCOUNTER — OFFICE VISIT (OUTPATIENT)
Dept: OTHER | Facility: OUTPATIENT CENTER | Age: 65
End: 2020-02-12
Payer: COMMERCIAL

## 2020-02-12 DIAGNOSIS — F43.21 ADJUSTMENT DISORDER WITH DEPRESSED MOOD: Primary | ICD-10-CM

## 2020-02-12 NOTE — PROGRESS NOTES
[facility] -  Case Progress Note    Date of Service: 2/12/2020  Client Name: Herminia Garcia  YOB: 1955  MRN:  [id number]  Treating Provider: Diane Menendez, PhD, [facility]  Type of Session: Individual  Present in Session: Herminia  Number of Minutes:  60  DA & tx plan: 8/27/19; yearly tx plan    Health Maintenance Summary - Mental Health Treatment Plan       Status Date      MENTAL HEALTH TX PLAN Next Due 7/27/2020      Done 8/27/2019 [facility] MENTAL HEALTH TX PLAN SCAN     Done 8/9/2018      Done 8/28/2017           Current Symptoms/Status:  Herminia expresses marked distress over spouse's transition. Symptoms include: Intermittent sadness, anxiety, stress, difficulty concentrating, regarding stressors of spouse's transition and marital separation    Progress Toward Treatment Goals:     Goals include adjust to changes related to spouse's recent expressed interest in identifying as transgender and marital separation. Herminia is making small progress as she works towards less rumination.    Intervention: Modality and Description:    Interpersonal therapy, stress management. Herminia discussed socializing as single person who is not able to eat and drink with others. She stated that she often feels uncomfortable and sad after socializing. She also requested to not be a part of her sibling group texts due to feeling unseen and the distracting nature of these texts. She noted an increased ability to focus since texting less. Discussed defining belongingness and realistic expectations of what that means in life.     Response to Intervention:  Herminia was very open and process oriented    Assignment:  Journal about self-care and identity, belongingness.     Diagnosis:  Visit Diagnosis, Associated Orders, and Comments     ICD-10-CM    1. Adjustment disorder with depressed mood F43.21 Individual Psychotherapy (53+ min) [[id number]]              Plan / Need for Future Services:  Return for therapy in 2  gianna.     Diane Menendez LMFT

## 2020-02-26 ENCOUNTER — OFFICE VISIT (OUTPATIENT)
Dept: OTHER | Facility: OUTPATIENT CENTER | Age: 65
End: 2020-02-26
Payer: COMMERCIAL

## 2020-02-26 DIAGNOSIS — F43.21 ADJUSTMENT DISORDER WITH DEPRESSED MOOD: Primary | ICD-10-CM

## 2020-02-28 NOTE — PROGRESS NOTES
"     [facility] -  Case Progress Note    Date of Service: 2/26/2020  Client Name: Herminia Garcia  YOB: 1955  MRN:  [id number]  Treating Provider: Diane Menendez, PhD, [facility]  Type of Session: Individual  Present in Session: Herminia  Number of Minutes:  60  DA & tx plan: 8/27/19; yearly tx plan    Health Maintenance Summary - Mental Health Treatment Plan       Status Date      MENTAL HEALTH TX PLAN Next Due 7/27/2020      Done 8/27/2019 HIM MENTAL HEALTH TX PLAN SCAN     Done 8/9/2018      Done 8/28/2017           Current Symptoms/Status:  Herminia expresses marked distress over spouse's transition. Symptoms include: Intermittent sadness, anxiety, stress, difficulty concentrating, regarding stressors of spouse's transition and marital separation    Progress Toward Treatment Goals:     Goals include adjust to changes related to spouse's recent expressed interest in identifying as transgender and marital separation. Herminia is making small progress as she works towards less rumination.    Intervention: Modality and Description:    Interpersonal therapy, stress management. Herminia reported on her homework about belongingness and integrated that with a lecture she attended on \"returning to zero\" - by a [employer] leader. She noted this was a positive experience for her as she begins to reframe her losses into opportunities. She processed how to shift her focus to not negate her feelings, but embrace both positive and negative feelings in an effort to be more authentic. Validated a desire to have authentic experiences with others, while also recognizing that relationship with self as most important.    Response to Intervention:  Herminia was very open and process oriented    Assignment:  Journal about self-care and identity, belongingness.     Diagnosis:  Visit Diagnosis, Associated Orders, and Comments     ICD-10-CM    1. Adjustment disorder with depressed mood F43.21 Individual Psychotherapy (53+ " min) [[id number]]              Plan / Need for Future Services:  Return for therapy in 2 weeks.     Diane Menendez LMFT

## 2020-03-11 ENCOUNTER — OFFICE VISIT (OUTPATIENT)
Dept: OTHER | Facility: OUTPATIENT CENTER | Age: 65
End: 2020-03-11
Payer: COMMERCIAL

## 2020-03-11 DIAGNOSIS — F43.22 ADJUSTMENT DISORDER WITH ANXIETY: Primary | ICD-10-CM

## 2020-03-11 NOTE — PROGRESS NOTES
[facility] -  Case Progress Note    Date of Service: 3/11/2020  Client Name: Herminia Garcia  YOB: 1955  MRN:  [id number]  Treating Provider: Diane Menendez, PhD, [facility]  Type of Session: Individual  Present in Session: Herminia  Number of Minutes:  60  DA & tx plan: 8/27/19; yearly tx plan    Health Maintenance Summary - Mental Health Treatment Plan       Status Date      MENTAL HEALTH TX PLAN Next Due 7/27/2020      Done 8/27/2019 [facility] MENTAL HEALTH TX PLAN SCAN     Done 8/9/2018      Done 8/28/2017           Current Symptoms/Status:  Herminia expresses marked distress over spouse's transition. Symptoms include: Intermittent sadness, anxiety, stress, difficulty concentrating, regarding stressors of spouse's transition and marital separation    Progress Toward Treatment Goals:     Goals include adjust to changes related to spouse's recent expressed interest in identifying as transgender and marital separation. Herminia is making small progress as she works towards less rumination.    Intervention: Modality and Description:    Interpersonal therapy, stress management. Herminia stated that she was offered an early care home option this week and needs to make a decision by 3/27. We processed the information that she has received and her perception that she was identified as nonessential. She has met with several people, including , to discuss ramifications of the decision. She was complimented on her ability to take control of the situation, noting that this is new behavior. Discussed ramifications for services if health insurance changed. Herminia noted the toxic work environment and feeling devalued as a professional.     Response to Intervention:  Herminia was very open and process oriented    Assignment:  Journal about self-care and identity, belongingness.     Diagnosis:  Visit Diagnosis, Associated Orders, and Comments     ICD-10-CM    1. Adjustment disorder with  depressed mood F43.21 Individual Psychotherapy (53+ min) [[id number]]              Plan / Need for Future Services:  Return for therapy in 2 weeks.     Diane Menendez LMFT

## 2020-03-25 ENCOUNTER — VIRTUAL VISIT (OUTPATIENT)
Dept: OTHER | Facility: OUTPATIENT CENTER | Age: 65
End: 2020-03-25
Payer: COMMERCIAL

## 2020-03-25 DIAGNOSIS — F43.22 ADJUSTMENT DISORDER WITH ANXIETY: Primary | ICD-10-CM

## 2020-03-25 NOTE — PROGRESS NOTES
"     [facility] -  Case Progress Note    Date of Service: 3/11/2020  Client Name: Herminia Garcia  YOB: 1955  MRN:  [id number]  Treating Provider: Diane Menendez, PhD, [facility]  Type of Session: Individual (telephone visit)  Present in Session: Herminia  Number of Minutes:  60  DA & tx plan: 8/27/19; yearly tx plan    Health Maintenance Summary - Mental Health Treatment Plan       Status Date      MENTAL HEALTH TX PLAN Next Due 7/27/2020      Done 8/27/2019 [facility] MENTAL HEALTH TX PLAN SCAN     Done 8/9/2018      Done 8/28/2017         The following was reviewed with the patient.   \"We have found that certain health care needs can be provided without the need for a face to face visit.  This service lets us provide the care you need with a phone conversation. I will have full access to your [facility] medical record during this entire phone call.   I will be taking notes for your medical record. Since this is like an office visit, we will bill your insurance company for this service. There are potential benefits and risks of telephone visits (e.g. limits to patient confidentiality) that differ from in-person visits.?  Confidentiality still applies for telephone services, and nobody will record the visit.  It is important to be in a quiet, private space that is free of distractions (including cell phone or other devices) during the visit.??If during the course of the call I believe a telephone visit is not appropriate, you will not be charged for this service\"    Consent has been obtained for this service by care team member: Yes    Current Symptoms/Status:  Herminia expresses marked distress over spouse's transition. Symptoms include: Intermittent sadness, anxiety, stress, difficulty concentrating, regarding stressors of spouse's transition and marital separation    Progress Toward Treatment Goals:     Goals include adjust to changes related to spouse's recent expressed interest in " identifying as transgender and marital separation. Herminia is making small progress as she works towards less rumination.    Intervention: Modality and Description:    Interpersonal therapy, stress management. Discussed coping strategies during time of isolation due to COVID. Herminia processed her difficulty living alone and grieving her relationship. She also addressed how to manage anxieties related to her adult children and her role in their lives during pandemic. OK'd video call when we are ready.    Response to Intervention:  Herminia was very open and process oriented    Assignment:  Journal about self-care and identity, belongingness.     Diagnosis:  Visit Diagnosis, Associated Orders, and Comments     ICD-10-CM    1. Adjustment disorder with depressed mood F43.21 Individual Psychotherapy (53+ min) [94667]              Plan / Need for Future Services:  Return for therapy in 2 weeks.     ELIJAH Velez

## 2020-04-08 ENCOUNTER — VIRTUAL VISIT (OUTPATIENT)
Dept: OTHER | Facility: OUTPATIENT CENTER | Age: 65
End: 2020-04-08
Payer: COMMERCIAL

## 2020-04-08 DIAGNOSIS — F43.22 ADJUSTMENT DISORDER WITH ANXIETY: Primary | ICD-10-CM

## 2020-04-09 NOTE — PROGRESS NOTES
Telemedicine Visit: The patient's condition can be safely assessed and treated via synchronous audio and visual telemedicine encounter.      Reason for Telemedicine Visit: COVID-19 restrictions    Originating Site (Patient Location): Patient's home    Distant Site (Provider Location): Provider Remote Setting            [facility] -  Case Progress Note    Date of Service: 4/9/2020  Client Name: Herminia Garcia  YOB: 1955  MRN:  [id number]  Treating Provider: Diane Menendez, PhD, [facility]  Type of Session: Individual (telephone visit)  Present in Session: Herminia  Number of Minutes:  45  DA & tx plan: 8/27/19; yearly tx plan    Health Maintenance Summary - Mental Health Treatment Plan       Status Date      MENTAL HEALTH TX PLAN Next Due 7/27/2020      Done 8/27/2019 [facility] MENTAL HEALTH TX PLAN SCAN     Done 8/9/2018      Done 8/28/2017             Current Symptoms/Status:  Herminia expresses marked distress over spouse's transition. Symptoms include: Intermittent sadness, anxiety, stress, difficulty concentrating, regarding stressors of spouse's transition and marital separation    Progress Toward Treatment Goals:     Goals include adjust to changes related to spouse's recent expressed interest in identifying as transgender and marital separation. Herminia is making small progress as she works towards less rumination.    Intervention: Modality and Description:    Interpersonal therapy, stress management. Discussed coping strategies during time of isolation due to COVID. Herminia discussed problems with zoom calls for purpose of socializing, noting that her siblings often do not hear her voice in a manner that feels validating. She feels that this problem has been heightened during marital separation because she no longer has her spouse to validate her. Herminia also discussed following through with divorce process and trying to make decisions about . She discussed and identified ways  to support herself rather than attending to partner's needs. She identified that this has been difficult for her.     Response to Intervention:  Herminia was very open and process oriented    Assignment:  Journal about self-care and identity, belongingness.     Diagnosis:  Visit Diagnosis, Associated Orders, and Comments     ICD-10-CM    1. Adjustment disorder with depressed mood F43.21 Individual Psychotherapy (53+ min) [10507]              Plan / Need for Future Services:  Return for therapy in 2 weeks.     Diane Menendez, LMFT        Consent:  The patient/guardian has verbally consented to: the potential risks and benefits of telemedicine (video visit) versus in person care; bill my insurance or make self-payment for services provided; and responsibility for payment of non-covered services.     Mode of Communication:  Video Conference via [employer]    As the provider I attest to compliance with applicable laws and regulations related to telemedicine.

## 2020-04-22 ENCOUNTER — VIRTUAL VISIT (OUTPATIENT)
Dept: OTHER | Facility: OUTPATIENT CENTER | Age: 65
End: 2020-04-22
Payer: COMMERCIAL

## 2020-04-22 DIAGNOSIS — F43.22 ADJUSTMENT DISORDER WITH ANXIETY: Primary | ICD-10-CM

## 2020-04-26 NOTE — PROGRESS NOTES
Telemedicine Visit: The patient's condition can be safely assessed and treated via synchronous audio and visual telemedicine encounter.      Reason for Telemedicine Visit: Services only offered telehealth - covid restrictions    Originating Site (Patient Location): Patient's home    Distant Site (Provider Location): Provider Remote Setting    Consent:  The patient/guardian has verbally consented to: the potential risks and benefits of telemedicine (video visit) versus in person care; bill my insurance or make self-payment for services provided; and responsibility for payment of non-covered services.     Mode of Communication:  Video Conference via [employer]    As the provider I attest to compliance with applicable laws and regulations related to telemedicine.           [place] [facility] -  Case Progress Note    Date of Service: 4/22/2020  Client Name: Herminia Garcia  YOB: 1955  MRN:  [id number]  Treating Provider: Diane Menendez, PhD, [facility]  Type of Session: Individual (telephone visit)  Present in Session: Herminia  Number of Minutes:  55  DA & tx plan: 8/27/19; yearly tx plan    Health Maintenance Summary - Mental Health Treatment Plan       Status Date      MENTAL HEALTH TX PLAN Next Due 7/27/2020      Done 8/27/2019 [facility] MENTAL HEALTH TX PLAN SCAN     Done 8/9/2018      Done 8/28/2017             Current Symptoms/Status:  Herminia expresses marked distress over spouse's transition. Symptoms include: Intermittent sadness, anxiety, stress, difficulty concentrating, regarding stressors of spouse's transition and marital separation    Progress Toward Treatment Goals:     Goals include adjust to changes related to spouse's recent expressed interest in identifying as transgender and marital separation. Herminia is making small progress as she works towards less rumination.    Intervention: Modality and Description:    Interpersonal therapy, stress management. Discussed coping strategies  during time of isolation due to COVID. In particular, Herminia discussed feeling overwhelmed by the state of her home and financial problems of her employer. She stated she is concerned she will lose her job. We discussed using meditation by Aristides Whitmore, focusing only on one spot of her house for organizing, and developing boundaries around her time. She noted being confused about whether or not to proceed with divorce due to financial insecurity.    Response to Intervention:  Herminia was very open and process oriented    Assignment:  Journal about self-care and identity, belongingness.     Diagnosis:  Visit Diagnosis, Associated Orders, and Comments     ICD-10-CM    1. Adjustment disorder with depressed mood F43.21 Individual Psychotherapy (53+ min) [[id number]]              Plan / Need for Future Services:  Return for therapy in 2 weeks.     Diane Menendez LMFT

## 2020-05-06 ENCOUNTER — VIRTUAL VISIT (OUTPATIENT)
Dept: OTHER | Facility: OUTPATIENT CENTER | Age: 65
End: 2020-05-06
Payer: COMMERCIAL

## 2020-05-06 DIAGNOSIS — F43.22 ADJUSTMENT DISORDER WITH ANXIETY: Primary | ICD-10-CM

## 2020-05-06 NOTE — PROGRESS NOTES
Telemedicine Visit: The patient's condition can be safely assessed and treated via synchronous audio and visual telemedicine encounter.      Reason for Telemedicine Visit: Services only offered telehealth - covid restrictions    Originating Site (Patient Location): Patient's home    Distant Site (Provider Location): Provider Remote Setting    Consent:  The patient/guardian has verbally consented to: the potential risks and benefits of telemedicine (video visit) versus in person care; bill my insurance or make self-payment for services provided; and responsibility for payment of non-covered services.     Mode of Communication:  Video Conference via doxy    As the provider I attest to compliance with applicable laws and regulations related to telemedicine.    Start time: 10:10 (did not get first link for several minutes)  Stop time: 11:00           [place] [facility] -  Case Progress Note    Date of Service: 5/6/2020  Client Name: Herminia Garcia  YOB: 1955  MRN:  [id number]  Treating Provider: Diane Menendez, PhD, LM  Type of Session: Individual (telephone visit)  Present in Session: Herminia  Number of Minutes:  50  DA & tx plan: 8/27/19; yearly tx plan    Health Maintenance Summary - Mental Health Treatment Plan       Status Date      MENTAL HEALTH TX PLAN Next Due 7/27/2020      Done 8/27/2019 [facility] MENTAL HEALTH TX PLAN SCAN     Done 8/9/2018      Done 8/28/2017             Current Symptoms/Status:  Herminia expresses marked distress over spouse's transition. Symptoms include: Intermittent sadness, anxiety, stress, difficulty concentrating, regarding stressors of spouse's transition and marital separation    Progress Toward Treatment Goals:     Goals include adjust to changes related to spouse's recent expressed interest in identifying as transgender and marital separation. Herminia is making small progress as she works towards less rumination.    Intervention: Modality and  Description:    Interpersonal therapy, stress management. Herminia reported that she has experienced much anxiety and distress due to uncertainty of her job. She noted that she has had many losses and she feels out of control. We discussed what is within her control as well validating her losses. We discussed longterm options. Herminia has met with two divorce attorneys. We discussed how to choose an  that matches her values and needs. Herminia processed feeling devalued at work and the impact this has on her anxiety and mood.    Response to Intervention:  Herminia was very open and process oriented    Assignment:  Journal about self-care and identity, belongingness.     Diagnosis:  Visit Diagnosis, Associated Orders, and Comments     ICD-10-CM    1. Adjustment disorder with depressed mood F43.21 Individual Psychotherapy (53+ min) [[id number]]              Plan / Need for Future Services:  Return for therapy in 2 weeks.     ELIJAH Velez

## 2020-05-20 ENCOUNTER — VIRTUAL VISIT (OUTPATIENT)
Dept: OTHER | Facility: OUTPATIENT CENTER | Age: 65
End: 2020-05-20
Payer: COMMERCIAL

## 2020-05-20 DIAGNOSIS — F43.21 ADJUSTMENT DISORDER WITH DEPRESSED MOOD: Primary | ICD-10-CM

## 2020-05-23 NOTE — PROGRESS NOTES
Telemedicine Visit: The patient's condition can be safely assessed and treated via synchronous audio and visual telemedicine encounter.      Reason for Telemedicine Visit: Services only offered telehealth - covid restrictions    Originating Site (Patient Location): Patient's home    Distant Site (Provider Location): Provider Remote Setting    Consent:  The patient/guardian has verbally consented to: the potential risks and benefits of telemedicine (video visit) versus in person care; bill my insurance or make self-payment for services provided; and responsibility for payment of non-covered services.     Mode of Communication:  Video Conference via doxy    As the provider I attest to compliance with applicable laws and regulations related to telemedicine.    Start and stop time 1:06 - 2:02           [place] [facility] -  Case Progress Note    Date of Service: 5/20/2020  Client Name: Herminia Garcia  YOB: 1955  MRN:  [id number]  Treating Provider: Diane Menendez, PhD, [facility]  Type of Session: Individual (telephone visit)  Present in Session: Herminia  Number of Minutes:  54  DA & tx plan: 8/27/19; yearly tx plan    Health Maintenance Summary - Mental Health Treatment Plan       Status Date      MENTAL HEALTH TX PLAN Next Due 7/27/2020      Done 8/27/2019 [facility] MENTAL HEALTH TX PLAN SCAN     Done 8/9/2018      Done 8/28/2017             Current Symptoms/Status:  Herminia expresses marked distress over spouse's transition. Symptoms include: Intermittent sadness, anxiety, stress, difficulty concentrating, regarding stressors of spouse's transition and marital separation    Progress Toward Treatment Goals:     Goals include adjust to changes related to spouse's recent expressed interest in identifying as transgender and marital separation. Herminia is making small progress as she works towards less rumination.    Intervention: Modality and Description:    Interpersonal therapy, stress management.  Herminia discussed continued stress related to finances and choosing . Discussed her decision making process and how to assist moving forward. Herminia discussed need to process out loud and gather much information. She discussed this process and concerns about making this decision.     Response to Intervention:  Herminia was very open and process oriented    Assignment:  Journal about self-care and identity, belongingness.     Diagnosis:  Visit Diagnosis, Associated Orders, and Comments     ICD-10-CM    1. Adjustment disorder with depressed mood F43.21 Individual Psychotherapy (53+ min) [[id number]]              Plan / Need for Future Services:  Return for therapy in 2 weeks.     ELIJAH Velez

## 2020-06-03 ENCOUNTER — VIRTUAL VISIT (OUTPATIENT)
Dept: OTHER | Facility: OUTPATIENT CENTER | Age: 65
End: 2020-06-03
Payer: COMMERCIAL

## 2020-06-03 DIAGNOSIS — F43.21 ADJUSTMENT DISORDER WITH DEPRESSED MOOD: Primary | ICD-10-CM
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
## 2020-06-17 ENCOUNTER — VIRTUAL VISIT (OUTPATIENT)
Dept: OTHER | Facility: OUTPATIENT CENTER | Age: 65
End: 2020-06-17
Payer: COMMERCIAL

## 2020-06-17 DIAGNOSIS — F43.22 ADJUSTMENT DISORDER WITH ANXIETY: Primary | ICD-10-CM

## 2020-06-17 NOTE — PROGRESS NOTES
Telemedicine Visit: The patient's condition can be safely assessed and treated via synchronous audio and visual telemedicine encounter.      Reason for Telemedicine Visit: Services only offered telehealth - covid restrictions    Originating Site (Patient Location): Patient's home    Distant Site (Provider Location): Provider Remote Setting    Consent:  The patient/guardian has verbally consented to: the potential risks and benefits of telemedicine (video visit) versus in person care; bill my insurance or make self-payment for services provided; and responsibility for payment of non-covered services.     Mode of Communication:  Video Conference via doxy    As the provider I attest to compliance with applicable laws and regulations related to telemedicine.    Start and stop time 4:00 - 4:58           [place] [facility] -  Case Progress Note    Date of Service: 6/17/2020  Client Name: Herminia Garcia  YOB: 1955  MRN:  [id number]  Treating Provider: Diane Menendez, PhD, [facility]  Type of Session: Individual (telephone visit)  Present in Session: Herminia  Number of Minutes:  58  DA & tx plan: 8/27/19; yearly tx plan    Health Maintenance Summary - Mental Health Treatment Plan       Status Date      MENTAL HEALTH TX PLAN Next Due 7/27/2020      Done 8/27/2019 [facility] MENTAL HEALTH TX PLAN SCAN     Done 8/9/2018      Done 8/28/2017             Current Symptoms/Status:  Herminia expresses marked distress over spouse's transition. Symptoms include: Intermittent sadness, anxiety, stress, difficulty concentrating, regarding stressors of spouse's transition and marital separation    Progress Toward Treatment Goals:     Goals include adjust to changes related to spouse's recent expressed interest in identifying as transgender and marital separation. Herminia is making small progress as she works towards less rumination.    Intervention: Modality and Description:    Interpersonal therapy, stress management,  narrative therapy. Herminia reported that her work still has not made announcements about lay-offs. She has decided upon an  and has decided to delay pursuing divorce due to other uncertainties. We discussed pros and cons of this. Herminia discussed her worries about money now and in the future, noting that her worries about aging are centered around having enough money to take care of her health and nutrition.     Herminia stated that she has begun a writing course and will use this to help explore decisions and feelings.    Response to Intervention:  Herminia was very open and process oriented    Assignment:  Journal about self-care and identity, belongingness.     Diagnosis:  Visit Diagnosis, Associated Orders, and Comments     ICD-10-CM    1. Adjustment disorder with depressed mood F43.21 Individual Psychotherapy (53+ min) [[id number]]              Plan / Need for Future Services:  Return for therapy in 2 weeks.     ELIJAH Velez

## 2020-07-01 ENCOUNTER — VIRTUAL VISIT (OUTPATIENT)
Dept: OTHER | Facility: OUTPATIENT CENTER | Age: 65
End: 2020-07-01
Payer: COMMERCIAL

## 2020-07-01 DIAGNOSIS — F43.22 ADJUSTMENT DISORDER WITH ANXIETY: Primary | ICD-10-CM

## 2020-07-05 NOTE — PROGRESS NOTES
Telemedicine Visit: The patient's condition can be safely assessed and treated via synchronous audio and visual telemedicine encounter.      Reason for Telemedicine Visit: Services only offered telehealth - covid restrictions    Originating Site (Patient Location): Patient's home    Distant Site (Provider Location): Provider Remote Setting    Consent:  The patient/guardian has verbally consented to: the potential risks and benefits of telemedicine (video visit) versus in person care; bill my insurance or make self-payment for services provided; and responsibility for payment of non-covered services.     Mode of Communication:  Video Conference via doxy    As the provider I attest to compliance with applicable laws and regulations related to telemedicine.    Start and stop time 4:00 - 4:58           [place] [facility] -  Case Progress Note    Date of Service: 7/1/2020  Client Name: Herminia Garcia  YOB: 1955  MRN:  [id number]  Treating Provider: Diane Menendez, PhD, [facility]  Type of Session: Individual (telephone visit)  Present in Session: Herminia  Number of Minutes:  58  DA & tx plan: 8/27/19; yearly tx plan    Health Maintenance Summary - Mental Health Treatment Plan       Status Date      MENTAL HEALTH TX PLAN Next Due 7/27/2020      Done 8/27/2019 [facility] MENTAL HEALTH TX PLAN SCAN     Done 8/9/2018      Done 8/28/2017             Current Symptoms/Status:  Herminia expresses marked distress over spouse's transition. Symptoms include: Intermittent sadness, anxiety, stress, difficulty concentrating, regarding stressors of spouse's transition and marital separation    Progress Toward Treatment Goals:     Goals include adjust to changes related to spouse's recent expressed interest in identifying as transgender and marital separation. Herminia is making small progress as she works towards less rumination.    Intervention: Modality and Description:    Interpersonal therapy, stress management,  narrative therapy. Herminia reported that she was not laid off. Herminia discussed her writing and read some of her writings. We discussed how to incorporate in her therapy. Themes heard included questioning where she begins and how divorce shifts who she is. She also questioned if stories box us in or free us. Herminia discussed accepting herself and affirming herself when siblings or friends do not affirm her experience. This is especially true related to her food issues.     Response to Intervention:  Herminia was very open and process oriented    Assignment:  Journal about self-care and identity, belongingness.     Diagnosis:  Visit Diagnosis, Associated Orders, and Comments     ICD-10-CM    1. Adjustment disorder with depressed mood F43.21 Individual Psychotherapy (53+ min) [[id number]]              Plan / Need for Future Services:  Return for therapy in 2 weeks.     ELIJAH Velez

## 2020-07-22 ENCOUNTER — VIRTUAL VISIT (OUTPATIENT)
Dept: OTHER | Facility: OUTPATIENT CENTER | Age: 65
End: 2020-07-22
Payer: COMMERCIAL

## 2020-07-22 DIAGNOSIS — F43.21 ADJUSTMENT DISORDER WITH DEPRESSED MOOD: Primary | ICD-10-CM

## 2020-07-22 NOTE — PROGRESS NOTES
Telemedicine Visit: The patient's condition can be safely assessed and treated via synchronous audio and visual telemedicine encounter.      Reason for Telemedicine Visit: Services only offered telehealth - covid restrictions    Originating Site (Patient Location): Patient's home    Distant Site (Provider Location): Provider Remote Setting    Consent:  The patient/guardian has verbally consented to: the potential risks and benefits of telemedicine (video visit) versus in person care; bill my insurance or make self-payment for services provided; and responsibility for payment of non-covered services.     Mode of Communication:  Video Conference via doxy    As the provider I attest to compliance with applicable laws and regulations related to telemedicine.    Start and stop time 4:00 - 4:55           [facility] -  Case Progress Note    Date of Service: 7/22/2020  Client Name: Herminia Garcia  YOB: 1955  MRN:  [id number]  Treating Provider: Diane Menendez, PhD, [facility]  Type of Session: Individual (telephone visit)  Present in Session: Herminia  Number of Minutes:  55  DA & tx plan: 8/27/19; yearly tx plan    Health Maintenance Summary - Mental Health Treatment Plan       Status Date      MENTAL HEALTH TX PLAN Next Due 7/27/2020      Done 8/27/2019 [facility] MENTAL HEALTH TX PLAN SCAN     Done 8/9/2018      Done 8/28/2017             Current Symptoms/Status:  Herminia expresses marked distress over spouse's transition. Symptoms include: Intermittent sadness, anxiety, stress, difficulty concentrating, regarding stressors of spouse's transition and marital separation    Progress Toward Treatment Goals:     Goals include adjust to changes related to spouse's recent expressed interest in identifying as transgender and marital separation. Herminia is making small progress as she works towards less rumination.    Intervention: Modality and Description:    Interpersonal therapy, stress management,  narrative therapy. Herminia discussed stressful moments and relief from stress in past couple of weeks. She noted that she was told that her job is not necessarily safe. She also went on vacation. We discussed her process of being with her daughter on vacation and feelings of loneliness when she returned home alone. Discussed why being alone has been such an adjustment and how to sit in those feelings when necessary, but also ask for social connections.     Response to Intervention:  Herminia was very open and process oriented    Assignment:  Journal about self-care and identity, belongingness.     Diagnosis:  Visit Diagnosis, Associated Orders, and Comments     ICD-10-CM    1. Adjustment disorder with depressed mood F43.21 Individual Psychotherapy (53+ min) [50201]              Plan / Need for Future Services:  Return for therapy in 2 weeks.     ELIJAH Velez

## 2020-08-05 ENCOUNTER — VIRTUAL VISIT (OUTPATIENT)
Dept: OTHER | Facility: OUTPATIENT CENTER | Age: 65
End: 2020-08-05
Payer: COMMERCIAL

## 2020-08-05 DIAGNOSIS — F43.21 ADJUSTMENT DISORDER WITH DEPRESSED MOOD: Primary | ICD-10-CM

## 2020-08-06 NOTE — PROGRESS NOTES
Telemedicine Visit: The patient's condition can be safely assessed and treated via synchronous audio and visual telemedicine encounter.      Reason for Telemedicine Visit: Services only offered telehealth - covid restrictions    Originating Site (Patient Location): Patient's home    Distant Site (Provider Location): Provider Remote Setting    Consent:  The patient/guardian has verbally consented to: the potential risks and benefits of telemedicine (video visit) versus in person care; bill my insurance or make self-payment for services provided; and responsibility for payment of non-covered services.     Mode of Communication:  Video Conference via doxy    As the provider I attest to compliance with applicable laws and regulations related to telemedicine.    Start and stop time 4:02 - 5:00           [facility] -  Case Progress Note    Date of Service: 7/22/2020  Client Name: Herminia Garcia  YOB: 1955  MRN:  [id number]  Treating Provider: Diane Menendez, PhD, [facility]  Type of Session: Individual (telephone visit)  Present in Session: Herminia  Number of Minutes:  55  DA & tx plan: 8/27/19; yearly tx plan    Health Maintenance Summary - Mental Health Treatment Plan       Status Date      MENTAL HEALTH TX PLAN Overdue 7/27/2020      Done 8/27/2019 [facility] MENTAL HEALTH TX PLAN SCAN     Done 8/9/2018      Done 8/28/2017             Current Symptoms/Status:  Herminia expresses marked distress over spouse's transition. Symptoms include: Intermittent sadness, anxiety, stress, difficulty concentrating, regarding stressors of spouse's transition and marital separation    Progress Toward Treatment Goals:     Goals include adjust to changes related to spouse's recent expressed interest in identifying as transgender and marital separation. Herminia is making small progress as she works towards less rumination.    Intervention: Modality and Description:    Interpersonal therapy, stress management,  narrative therapy. Herminia discovered that someone she was close to in the past  from COVID-19. She noted feeling alone in this grief as her support network did not engage in discussion with her when she reached out. She discussed her grief related to this lost relationship, worries about how he may have suffered, and the role he played in helping her heal from previous trauma.    Response to Intervention:  Herminia was very open and process oriented    Assignment:  Journal about self-care and identity, belongingness.     Diagnosis:  Visit Diagnosis, Associated Orders, and Comments     ICD-10-CM    1. Adjustment disorder with depressed mood F43.21 Individual Psychotherapy (53+ min) [46390]              Plan / Need for Future Services:  Return for therapy in 2 weeks.     Diane Menendez LMFT

## 2020-08-19 ENCOUNTER — VIRTUAL VISIT (OUTPATIENT)
Dept: OTHER | Facility: OUTPATIENT CENTER | Age: 65
End: 2020-08-19
Payer: COMMERCIAL

## 2020-08-19 DIAGNOSIS — F43.21 ADJUSTMENT DISORDER WITH DEPRESSED MOOD: Primary | ICD-10-CM
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
## 2020-09-02 ENCOUNTER — VIRTUAL VISIT (OUTPATIENT)
Dept: OTHER | Facility: OUTPATIENT CENTER | Age: 65
End: 2020-09-02
Payer: COMMERCIAL

## 2020-09-02 DIAGNOSIS — F43.29 ADJUSTMENT DISORDER WITH MIXED EMOTIONAL FEATURES: Primary | ICD-10-CM
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
## 2020-09-16 ENCOUNTER — VIRTUAL VISIT (OUTPATIENT)
Dept: OTHER | Facility: OUTPATIENT CENTER | Age: 65
End: 2020-09-16
Payer: COMMERCIAL

## 2020-09-16 DIAGNOSIS — F43.29 ADJUSTMENT DISORDER WITH MIXED EMOTIONAL FEATURES: Primary | ICD-10-CM

## 2020-09-18 NOTE — PROGRESS NOTES
[facility] -  Case Progress Note    Date of Service: 20   Name: Herminia Garcia  : 1955  Medical Record Number: [id number]  Treating Provider: Diane Menendez, PhD, [facility]  Type of Session: Individual  Present in Session: Herminia  Number of Minutes:  58    Video start time: 4:00  Video end time: 4:58    Telemedicine Visit: The patient's condition can be safely assessed and treated via synchronous audio and visual telemedicine encounter.      Reason for Telemedicine Visit: Services only offered telehealth    Originating Site (Patient Location): Patient's home    Distant Site (Provider Location): Provider Remote Setting    Consent:  The patient/guardian has verbally consented to: the potential risks and benefits of telemedicine (video visit) versus in person care; bill my insurance or make self-payment for services provided; and responsibility for payment of non-covered services.     Mode of Communication:  Video Conference via [employer]    As the provider I attest to compliance with applicable laws and regulations related to telemedicine.      Current Symptoms/Status:  Sadness, anxiety, in reaction to stress or changes    Progress Toward Treatment Goals:   Making progress towards goals.    Intervention: Modality and Description:  SFT. Herminia discussed insights she has been having related to feeling abandoned during her marital separation and managing expectations with siblings. She processed feelings of aloneness related to food and health. She discussed her history of self-confidence, examining times when it was higher and lower.    Response to Intervention:  Open and cooperative    Assignment:  Keep writing        DSM-5 Diagnoses:  Diagnoses       Codes Comments    Adjustment disorder with mixed emotional features    -  Primary F43.29           Plan/Need for Future Services:  Return for therapy in 2 weeks to treat diagnosed problems.        Diane Menendez, PhD, [facility]    TREATMENT  PLAN    Date of Treatment Plan 2020  Name: Herminia Garcia  : 1955  Medical Record Number: [id number]  Treating Provider: Diane Menendez, PhD, [facility]        Current Status:    Depression/Mood:  Sad  Decreased Self-Esteem    Anxiety/Panic:  Worry  Psysiological Reactivity    Thought:   Reports no problems or symptoms at this time    Sensorium:  Reports no problems or symptoms at this time    Behavior/Health:  Reports no problems or symptoms at this time    Chemical Use:  Denies both Alcohol and Drug Abuse    Sexual Problems:  Reports no problems or symptoms at this time    Suicide Risk Assessment:  Assessed Level of Immediate Risk:  NO  Ideation:  NO  Plan:  NO  Means:  NOT APPLICABLE  Intent:  NO    Homicide Risk Assessment:  Assessed Level of Immediate Risk:  NOT REVIEWED  Ideation:  NOT REVIEWED  Plan:  NOT REVIEWED  Means:  NOT REVIEWED  Intent:  NOT REVIEWED    Impact of Symptoms on Function:  Decreased Social/Family Function    DSM-5 Diagnoses:   Diagnoses       Codes Comments    Adjustment disorder with mixed emotional features    -  Primary F43.29           Problem(s):  1. stress  2. Self-confidence  3. Panic when stressed    Short-Long Term Goals  Decrease Symptoms  Increase Coping  Increase Decision Making    Interventions  [place] [facility]    Expected Outcomes and Prognosis  Expected improvement    Anticipated Treatment Duration:  1 year    Frequency of Sessions  2 x / Month    Progress Update (for Plan Update Only)  Compliant, Progressing and Improving - Needs More Sessions      Discharge & Aftercare Goals: To Be Determined.    Care Coordination: No    Consent to Treatment:     Patient participated in this treatment planning process and indicated verbal agreement with the above treatment plan.    Patient Signature: virtually agreed   Date: 2020      Provider Signature: Diane Menendez, PhD, [facility]  Date: 2020

## 2020-09-30 ENCOUNTER — VIRTUAL VISIT (OUTPATIENT)
Dept: OTHER | Facility: OUTPATIENT CENTER | Age: 65
End: 2020-09-30
Payer: COMMERCIAL

## 2020-09-30 DIAGNOSIS — F43.29 ADJUSTMENT DISORDER WITH MIXED EMOTIONAL FEATURES: Primary | ICD-10-CM

## 2020-10-01 NOTE — PROGRESS NOTES
[place] [facility] -  Case Progress Note    Date of Service: 20   Name: Herminia Garcia  : 1955  Medical Record Number: [id number]  Treating Provider: Diane Menendez, PhD, [facility]  Type of Session: Individual  Present in Session: Herminia  Number of Minutes:  50    Video start time: 4:10  Video end time: 5:00    Telemedicine Visit: The patient's condition can be safely assessed and treated via synchronous audio and visual telemedicine encounter.      Reason for Telemedicine Visit: Services only offered telehealth    Originating Site (Patient Location): Patient's home    Distant Site (Provider Location): Provider Remote Setting    Consent:  The patient/guardian has verbally consented to: the potential risks and benefits of telemedicine (video visit) versus in person care; bill my insurance or make self-payment for services provided; and responsibility for payment of non-covered services.     Mode of Communication:  Video Conference via [employer]    As the provider I attest to compliance with applicable laws and regulations related to telemedicine.      Current Symptoms/Status:  Sadness, anxiety, in reaction to stress or changes    Progress Toward Treatment Goals:   Making progress towards goals.    Intervention: Modality and Description:  SFT. Herminia discussed feeling liberated through attending to things she and former spouse had not attended to due to spouse's depression and her health. Used metaphor to recognize feeling stuck and becoming unstuck. She addressed making decisions alone and how to move past data collection phase. She stated that she has been coping through ruben music and finding a connection to her father and past through music.     Response to Intervention:  Open and cooperative    Assignment:  Keep writing        DSM-5 Diagnoses:  Diagnoses       Codes Comments    Adjustment disorder with mixed emotional features    -  Primary F43.29           Plan/Need for Future  Services:  Return for therapy in 2 weeks to treat diagnosed problems.        Diane Menendez, PhD, [facility]    TREATMENT PLAN    Date of Treatment Plan 2020  Name: Herminia Garcia  : 1955  Medical Record Number: [id number]  Treating Provider: Diane Menendez, PhD, [facility]        Current Status:    Depression/Mood:  Sad  Decreased Self-Esteem    Anxiety/Panic:  Worry  Psysiological Reactivity    Thought:   Reports no problems or symptoms at this time    Sensorium:  Reports no problems or symptoms at this time    Behavior/Health:  Reports no problems or symptoms at this time    Chemical Use:  Denies both Alcohol and Drug Abuse    Sexual Problems:  Reports no problems or symptoms at this time    Suicide Risk Assessment:  Assessed Level of Immediate Risk:  NO  Ideation:  NO  Plan:  NO  Means:  NOT APPLICABLE  Intent:  NO    Homicide Risk Assessment:  Assessed Level of Immediate Risk:  NOT REVIEWED  Ideation:  NOT REVIEWED  Plan:  NOT REVIEWED  Means:  NOT REVIEWED  Intent:  NOT REVIEWED    Impact of Symptoms on Function:  Decreased Social/Family Function    DSM-5 Diagnoses:   Diagnoses       Codes Comments    Adjustment disorder with mixed emotional features    -  Primary F43.29           Problem(s):  1. stress  2. Self-confidence  3. Panic when stressed    Short-Long Term Goals  Decrease Symptoms  Increase Coping  Increase Decision Making    Interventions  [facility]    Expected Outcomes and Prognosis  Expected improvement    Anticipated Treatment Duration:  1 year    Frequency of Sessions  2 x / Month    Progress Update (for Plan Update Only)  Compliant, Progressing and Improving - Needs More Sessions      Discharge & Aftercare Goals: To Be Determined.    Care Coordination: No    Consent to Treatment:     Patient participated in this treatment planning process and indicated verbal agreement with the above treatment plan.    Patient Signature: virtually agreed   Date:  9/2/2020      Provider Signature: Diane Menendez, PhD, LM  Date: 9/2/2020

## 2020-11-06 ENCOUNTER — VIRTUAL VISIT (OUTPATIENT)
Dept: PSYCHOLOGY | Facility: CLINIC | Age: 65
End: 2020-11-06
Payer: COMMERCIAL

## 2020-11-06 DIAGNOSIS — F43.29 ADJUSTMENT DISORDER WITH MIXED EMOTIONAL FEATURES: Primary | ICD-10-CM

## 2020-11-06 PROCEDURE — 99207 PR NO CHARGE LOS: CPT | Performed by: MARRIAGE & FAMILY THERAPIST

## 2020-11-06 PROCEDURE — 90837 PSYTX W PT 60 MINUTES: CPT | Mod: 95 | Performed by: MARRIAGE & FAMILY THERAPIST

## 2020-11-08 NOTE — PROGRESS NOTES
[place] [facility] -  Case Progress Note    Date of Service: 20   Name: Herminia Garcia  : 1955  Medical Record Number: [id number]  Treating Provider: Diane Menendez, PhD, [facility]  Type of Session: Individual  Present in Session: Herminia  Number of Minutes:  58    Video start time: 4:30  Video end time: 5:28    Telemedicine Visit: The patient's condition can be safely assessed and treated via synchronous audio and visual telemedicine encounter.      Reason for Telemedicine Visit: Services only offered telehealth    Originating Site (Patient Location): Patient's home    Distant Site (Provider Location): Provider Remote Setting    Consent:  The patient/guardian has verbally consented to: the potential risks and benefits of telemedicine (video visit) versus in person care; bill my insurance or make self-payment for services provided; and responsibility for payment of non-covered services.     Mode of Communication:  Video Conference via [employer]    As the provider I attest to compliance with applicable laws and regulations related to telemedicine.      Current Symptoms/Status:  Sadness, anxiety, in reaction to stress or changes    Progress Toward Treatment Goals:   Making progress towards goals.    Intervention: Modality and Description:  SFT and supportive therapy. Discussed areas of improvement in mood and provided support for losses. Herminia noted that she had a useful conversation with her sister. She also expressed concern about coworker losing her job and the feeling of no trust at her job and many relationships. She noted that at times she feels sadness and other times she feels relief after writing. Discussed acceptance of negative emotions, in particular related to loss.    Response to Intervention:  Open and cooperative    Assignment:  Keep writing        DSM-5 Diagnoses:  Diagnoses       Codes Comments    Adjustment disorder with mixed emotional features    -  Primary F43.29            Plan/Need for Future Services:  Return for therapy in 2 weeks to treat diagnosed problems.        Diane Menendez, PhD, [facility]    TREATMENT PLAN    Date of Treatment Plan 2020  Name: Herminia Garcia  : 1955  Medical Record Number: [id number]  Treating Provider: Diane Menendez, PhD, [facility]        Current Status:    Depression/Mood:  Sad  Decreased Self-Esteem    Anxiety/Panic:  Worry  Psysiological Reactivity    Thought:   Reports no problems or symptoms at this time    Sensorium:  Reports no problems or symptoms at this time    Behavior/Health:  Reports no problems or symptoms at this time    Chemical Use:  Denies both Alcohol and Drug Abuse    Sexual Problems:  Reports no problems or symptoms at this time    Suicide Risk Assessment:  Assessed Level of Immediate Risk:  NO  Ideation:  NO  Plan:  NO  Means:  NOT APPLICABLE  Intent:  NO    Homicide Risk Assessment:  Assessed Level of Immediate Risk:  NOT REVIEWED  Ideation:  NOT REVIEWED  Plan:  NOT REVIEWED  Means:  NOT REVIEWED  Intent:  NOT REVIEWED    Impact of Symptoms on Function:  Decreased Social/Family Function    DSM-5 Diagnoses:   Diagnoses       Codes Comments    Adjustment disorder with mixed emotional features    -  Primary F43.29           Problem(s):  1. stress  2. Self-confidence  3. Panic when stressed    Short-Long Term Goals  Decrease Symptoms  Increase Coping  Increase Decision Making    Interventions  [facility]    Expected Outcomes and Prognosis  Expected improvement    Anticipated Treatment Duration:  1 year    Frequency of Sessions  2 x / Month    Progress Update (for Plan Update Only)  Compliant, Progressing and Improving - Needs More Sessions      Discharge & Aftercare Goals: To Be Determined.    Care Coordination: No    Consent to Treatment:     Patient participated in this treatment planning process and indicated verbal agreement with the above treatment plan.    Patient Signature: virtually agreed    Date: 9/2/2020      Provider Signature: Diane Menendez, PhD, [facility]  Date: 9/2/2020

## 2020-11-18 ENCOUNTER — VIRTUAL VISIT (OUTPATIENT)
Dept: PSYCHOLOGY | Facility: CLINIC | Age: 65
End: 2020-11-18
Payer: COMMERCIAL

## 2020-11-18 DIAGNOSIS — F43.29 ADJUSTMENT DISORDER WITH MIXED EMOTIONAL FEATURES: Primary | ICD-10-CM

## 2020-11-18 PROCEDURE — 90837 PSYTX W PT 60 MINUTES: CPT | Mod: 95 | Performed by: MARRIAGE & FAMILY THERAPIST

## 2020-11-22 NOTE — PROGRESS NOTES
[place] [facility] -  Case Progress Note    Date of Service: 20   Name: Herminia Garcia  : 1955  Medical Record Number: [id number]  Treating Provider: Diane Menendez, PhD, [facility]  Type of Session: Individual  Present in Session: Herminia  Number of Minutes:  58    Video start time: 4:00  Video end time: 4:58    Telemedicine Visit: The patient's condition can be safely assessed and treated via synchronous audio and visual telemedicine encounter.      Reason for Telemedicine Visit: Services only offered telehealth    Originating Site (Patient Location): Patient's home    Distant Site (Provider Location): Provider Remote Setting    Consent:  The patient/guardian has verbally consented to: the potential risks and benefits of telemedicine (video visit) versus in person care; bill my insurance or make self-payment for services provided; and responsibility for payment of non-covered services.     Mode of Communication:  Video Conference via [employer]    As the provider I attest to compliance with applicable laws and regulations related to telemedicine.      Current Symptoms/Status:  Sadness, anxiety, in reaction to stress or changes    Progress Toward Treatment Goals:   Making progress towards goals.    Intervention: Modality and Description:  SFT and supportive therapy. Discussed history of chaos versus stability in her life. She shared more life history of feeling she was unanchored, but that marriage helped her feel more anchored. We discussed finding that stability in herself and in friendships. She shared information about a friend that has been more emotionally present than others.    Response to Intervention:  Open and cooperative    Assignment:  Keep writing        DSM-5 Diagnoses:  Diagnoses       Codes Comments    Adjustment disorder with mixed emotional features    -  Primary F43.29           Plan/Need for Future Services:  Return for therapy in 2 weeks to treat diagnosed problems.         Diane Menendez, PhD, [facility]    TREATMENT PLAN    Date of Treatment Plan 2020  Name: Herminia Garcia  : 1955  Medical Record Number: [id number]  Treating Provider: Diane Menendez, PhD, [facility]        Current Status:    Depression/Mood:  Sad  Decreased Self-Esteem    Anxiety/Panic:  Worry  Psysiological Reactivity    Thought:   Reports no problems or symptoms at this time    Sensorium:  Reports no problems or symptoms at this time    Behavior/Health:  Reports no problems or symptoms at this time    Chemical Use:  Denies both Alcohol and Drug Abuse    Sexual Problems:  Reports no problems or symptoms at this time    Suicide Risk Assessment:  Assessed Level of Immediate Risk:  NO  Ideation:  NO  Plan:  NO  Means:  NOT APPLICABLE  Intent:  NO    Homicide Risk Assessment:  Assessed Level of Immediate Risk:  NOT REVIEWED  Ideation:  NOT REVIEWED  Plan:  NOT REVIEWED  Means:  NOT REVIEWED  Intent:  NOT REVIEWED    Impact of Symptoms on Function:  Decreased Social/Family Function    DSM-5 Diagnoses:   Diagnoses       Codes Comments    Adjustment disorder with mixed emotional features    -  Primary F43.29           Problem(s):  1. stress  2. Self-confidence  3. Panic when stressed    Short-Long Term Goals  Decrease Symptoms  Increase Coping  Increase Decision Making    Interventions  [facility]    Expected Outcomes and Prognosis  Expected improvement    Anticipated Treatment Duration:  1 year    Frequency of Sessions  2 x / Month    Progress Update (for Plan Update Only)  Compliant, Progressing and Improving - Needs More Sessions      Discharge & Aftercare Goals: To Be Determined.    Care Coordination: No    Consent to Treatment:     Patient participated in this treatment planning process and indicated verbal agreement with the above treatment plan.    Patient Signature: virtually agreed   Date: 2020      Provider Signature: Diane Menendez PhD, LM[facility]  Date: 2020

## 2020-12-02 ENCOUNTER — VIRTUAL VISIT (OUTPATIENT)
Dept: PSYCHOLOGY | Facility: CLINIC | Age: 65
End: 2020-12-02
Payer: COMMERCIAL

## 2020-12-02 DIAGNOSIS — F43.29 ADJUSTMENT DISORDER WITH MIXED EMOTIONAL FEATURES: Primary | ICD-10-CM

## 2020-12-02 PROCEDURE — 90837 PSYTX W PT 60 MINUTES: CPT | Mod: 95 | Performed by: MARRIAGE & FAMILY THERAPIST

## 2020-12-05 NOTE — PROGRESS NOTES
[place] [facility] -  Case Progress Note    Date of Service: 20   Name: Herminia Garcia  : 1955  Medical Record Number: [id number]  Treating Provider: Diane Menendez, PhD, [facility]  Type of Session: Individual  Present in Session: Herminia  Number of Minutes:  55    Video start time: 1:00  Video end time: 1:55    Telemedicine Visit: The patient's condition can be safely assessed and treated via synchronous audio and visual telemedicine encounter.      Reason for Telemedicine Visit: Services only offered telehealth    Originating Site (Patient Location): Patient's home    Distant Site (Provider Location): Provider Remote Setting    Consent:  The patient/guardian has verbally consented to: the potential risks and benefits of telemedicine (video visit) versus in person care; bill my insurance or make self-payment for services provided; and responsibility for payment of non-covered services.     Mode of Communication:  Video Conference via [employer]    As the provider I attest to compliance with applicable laws and regulations related to telemedicine.      Current Symptoms/Status:  Sadness, anxiety, in reaction to stress or changes    Progress Toward Treatment Goals:   Making progress towards goals.    Intervention: Modality and Description:  SFT and supportive therapy. Herminia stated that she has been upset by group dynamics in several situations and is considering pulling herself out of these situations. Explored her feelings of not fitting or feeling some pressure to be a certain way.    Response to Intervention:  Open and cooperative    Assignment:  Keep writing        DSM-5 Diagnoses:  Diagnoses       Codes Comments    Adjustment disorder with mixed emotional features    -  Primary F43.29           Plan/Need for Future Services:  Return for therapy in 2 weeks to treat diagnosed problems.        Diane Menendez, PhD, [facility]    TREATMENT PLAN    Date of Treatment Plan 2020  Name:  Herminia Garcia  : 1955  Medical Record Number: [id number]  Treating Provider: Diane Menendez, PhD, [facility]        Current Status:    Depression/Mood:  Sad  Decreased Self-Esteem    Anxiety/Panic:  Worry  Psysiological Reactivity    Thought:   Reports no problems or symptoms at this time    Sensorium:  Reports no problems or symptoms at this time    Behavior/Health:  Reports no problems or symptoms at this time    Chemical Use:  Denies both Alcohol and Drug Abuse    Sexual Problems:  Reports no problems or symptoms at this time    Suicide Risk Assessment:  Assessed Level of Immediate Risk:  NO  Ideation:  NO  Plan:  NO  Means:  NOT APPLICABLE  Intent:  NO    Homicide Risk Assessment:  Assessed Level of Immediate Risk:  NOT REVIEWED  Ideation:  NOT REVIEWED  Plan:  NOT REVIEWED  Means:  NOT REVIEWED  Intent:  NOT REVIEWED    Impact of Symptoms on Function:  Decreased Social/Family Function    DSM-5 Diagnoses:   Diagnoses       Codes Comments    Adjustment disorder with mixed emotional features    -  Primary F43.29           Problem(s):  1. stress  2. Self-confidence  3. Panic when stressed    Short-Long Term Goals  Decrease Symptoms  Increase Coping  Increase Decision Making    Interventions  [facility]    Expected Outcomes and Prognosis  Expected improvement    Anticipated Treatment Duration:  1 year    Frequency of Sessions  2 x / Month    Progress Update (for Plan Update Only)  Compliant, Progressing and Improving - Needs More Sessions      Discharge & Aftercare Goals: To Be Determined.    Care Coordination: No    Consent to Treatment:     Patient participated in this treatment planning process and indicated verbal agreement with the above treatment plan.    Patient Signature: virtually agreed   Date: 2020      Provider Signature: Diane Menendez, PhD, [facility]  Date: 2020

## 2020-12-16 ENCOUNTER — VIRTUAL VISIT (OUTPATIENT)
Dept: PSYCHOLOGY | Facility: CLINIC | Age: 65
End: 2020-12-16
Payer: COMMERCIAL

## 2020-12-16 DIAGNOSIS — F43.29 ADJUSTMENT DISORDER WITH MIXED EMOTIONAL FEATURES: Primary | ICD-10-CM

## 2020-12-16 PROCEDURE — 99207 PR NO CHARGE LOS: CPT | Performed by: MARRIAGE & FAMILY THERAPIST

## 2020-12-16 PROCEDURE — 90837 PSYTX W PT 60 MINUTES: CPT | Mod: 95 | Performed by: MARRIAGE & FAMILY THERAPIST

## 2020-12-20 NOTE — PROGRESS NOTES
[place] [facility] -  Case Progress Note    Date of Service: 20   Name: Herminia Garcia  : 1955  Medical Record Number: [id number]  Treating Provider: Diane Menendez, PhD, [facility]  Type of Session: Individual  Present in Session: Herminia  Number of Minutes:  55    Video start time: 1:00  Video end time: 1:55    Telemedicine Visit: The patient's condition can be safely assessed and treated via synchronous audio and visual telemedicine encounter.      Reason for Telemedicine Visit: Services only offered telehealth    Originating Site (Patient Location): Patient's home    Distant Site (Provider Location): Provider Remote Setting    Consent:  The patient/guardian has verbally consented to: the potential risks and benefits of telemedicine (video visit) versus in person care; bill my insurance or make self-payment for services provided; and responsibility for payment of non-covered services.     Mode of Communication:  Video Conference via [employer]    As the provider I attest to compliance with applicable laws and regulations related to telemedicine.      Current Symptoms/Status:  Sadness, anxiety, in reaction to stress or changes    Progress Toward Treatment Goals:   Making progress towards goals.    Intervention: Modality and Description:  SFT and supportive therapy. Herminia discussed asserting her needs with her older sister following an exchange that was upsetting. She discussed her feeling unseen when in groups. She asked questions about how to view her marital history in light of her spouse's change in gender. We discussed her tendency to want to avoid or compartmentalize, but that doesn't work for her in current situation.    Response to Intervention:  Open and cooperative    Assignment:  Keep writing        DSM-5 Diagnoses:  Diagnoses       Codes Comments    Adjustment disorder with mixed emotional features    -  Primary F43.29           Plan/Need for Future Services:  Return for therapy in 2  weeks to treat diagnosed problems.        Diane Menendez, PhD, [facility]    TREATMENT PLAN    Date of Treatment Plan 2020  Name: Herminia Garcia  : 1955  Medical Record Number: [id number]  Treating Provider: Diane Menendez, PhD, [facility]        Current Status:    Depression/Mood:  Sad  Decreased Self-Esteem    Anxiety/Panic:  Worry  Psysiological Reactivity    Thought:   Reports no problems or symptoms at this time    Sensorium:  Reports no problems or symptoms at this time    Behavior/Health:  Reports no problems or symptoms at this time    Chemical Use:  Denies both Alcohol and Drug Abuse    Sexual Problems:  Reports no problems or symptoms at this time    Suicide Risk Assessment:  Assessed Level of Immediate Risk:  NO  Ideation:  NO  Plan:  NO  Means:  NOT APPLICABLE  Intent:  NO    Homicide Risk Assessment:  Assessed Level of Immediate Risk:  NOT REVIEWED  Ideation:  NOT REVIEWED  Plan:  NOT REVIEWED  Means:  NOT REVIEWED  Intent:  NOT REVIEWED    Impact of Symptoms on Function:  Decreased Social/Family Function    DSM-5 Diagnoses:   Diagnoses       Codes Comments    Adjustment disorder with mixed emotional features    -  Primary F43.29           Problem(s):  1. stress  2. Self-confidence  3. Panic when stressed    Short-Long Term Goals  Decrease Symptoms  Increase Coping  Increase Decision Making    Interventions  [facility]    Expected Outcomes and Prognosis  Expected improvement    Anticipated Treatment Duration:  1 year    Frequency of Sessions  2 x / Month    Progress Update (for Plan Update Only)  Compliant, Progressing and Improving - Needs More Sessions      Discharge & Aftercare Goals: To Be Determined.    Care Coordination: No    Consent to Treatment:     Patient participated in this treatment planning process and indicated verbal agreement with the above treatment plan.    Patient Signature: virtually agreed   Date: 2020      Provider Signature: Diane Menendez  PhD, LMFT  Date: 9/2/2020

## 2020-12-30 ENCOUNTER — VIRTUAL VISIT (OUTPATIENT)
Dept: PSYCHOLOGY | Facility: CLINIC | Age: 65
End: 2020-12-30
Payer: COMMERCIAL

## 2020-12-30 DIAGNOSIS — F43.29 ADJUSTMENT DISORDER WITH MIXED EMOTIONAL FEATURES: Primary | ICD-10-CM

## 2020-12-30 PROCEDURE — 90837 PSYTX W PT 60 MINUTES: CPT | Mod: 95 | Performed by: MARRIAGE & FAMILY THERAPIST

## 2020-12-31 NOTE — PROGRESS NOTES
[place] [facility] -  Case Progress Note    Date of Service: 20   Name: Hermiina Garcia  : 1955  Medical Record Number: [id number]  Treating Provider: Diane Menendez, PhD, [facility]  Type of Session: Individual  Present in Session: Herminia  Number of Minutes:  55    Video start time: 7:00  Video end time: 7:55    Telemedicine Visit: The patient's condition can be safely assessed and treated via synchronous audio and visual telemedicine encounter.      Reason for Telemedicine Visit: Services only offered telehealth    Originating Site (Patient Location): Patient's home    Distant Site (Provider Location): Provider Remote Setting    Consent:  The patient/guardian has verbally consented to: the potential risks and benefits of telemedicine (video visit) versus in person care; bill my insurance or make self-payment for services provided; and responsibility for payment of non-covered services.     Mode of Communication:  Video Conference via [employer]    As the provider I attest to compliance with applicable laws and regulations related to telemedicine.      Current Symptoms/Status:  Sadness, anxiety, in reaction to stress or changes    Progress Toward Treatment Goals:   Making progress towards goals.    Intervention: Modality and Description:  SFT and supportive therapy. Herminia processed losses that were highlighted during holidays. She continues to feel as though she does not belong in many spaces and expressed sadness about this. Herminia also discussed how parenting adult children has lead to feelings of loneliness while also being proud of their accomplishments.    Response to Intervention:  Open and cooperative    Assignment:  None at this time.        DSM-5 Diagnoses:  Diagnoses       Codes Comments    Adjustment disorder with mixed emotional features    -  Primary F43.29           Plan/Need for Future Services:  Return for therapy in 2 weeks to treat diagnosed problems.        Diane Menendez,  PhD, LMFT    TREATMENT PLAN    Date of Treatment Plan 2020  Name: Herminia Garcia  : 1955  Medical Record Number: [id number]  Treating Provider: Diane Menendez, PhD, [facility]        Current Status:    Depression/Mood:  Sad  Decreased Self-Esteem    Anxiety/Panic:  Worry  Psysiological Reactivity    Thought:   Reports no problems or symptoms at this time    Sensorium:  Reports no problems or symptoms at this time    Behavior/Health:  Reports no problems or symptoms at this time    Chemical Use:  Denies both Alcohol and Drug Abuse    Sexual Problems:  Reports no problems or symptoms at this time    Suicide Risk Assessment:  Assessed Level of Immediate Risk:  NO  Ideation:  NO  Plan:  NO  Means:  NOT APPLICABLE  Intent:  NO    Homicide Risk Assessment:  Assessed Level of Immediate Risk:  NOT REVIEWED  Ideation:  NOT REVIEWED  Plan:  NOT REVIEWED  Means:  NOT REVIEWED  Intent:  NOT REVIEWED    Impact of Symptoms on Function:  Decreased Social/Family Function    DSM-5 Diagnoses:   Diagnoses       Codes Comments    Adjustment disorder with mixed emotional features    -  Primary F43.29           Problem(s):  1. stress  2. Self-confidence  3. Panic when stressed    Short-Long Term Goals  Decrease Symptoms  Increase Coping  Increase Decision Making    Interventions  [facility] [place]    Expected Outcomes and Prognosis  Expected improvement    Anticipated Treatment Duration:  1 year    Frequency of Sessions  2 x / Month    Progress Update (for Plan Update Only)  Compliant, Progressing and Improving - Needs More Sessions      Discharge & Aftercare Goals: To Be Determined.    Care Coordination: No    Consent to Treatment:     Patient participated in this treatment planning process and indicated verbal agreement with the above treatment plan.    Patient Signature: virtually agreed   Date: 2020      Provider Signature: Diane Menendez, PhD, [facility]  Date: 2020

## 2021-01-10 ENCOUNTER — HEALTH MAINTENANCE LETTER (OUTPATIENT)
Age: 66
End: 2021-01-10

## 2021-01-19 ENCOUNTER — VIRTUAL VISIT (OUTPATIENT)
Dept: PSYCHOLOGY | Facility: CLINIC | Age: 66
End: 2021-01-19
Payer: COMMERCIAL

## 2021-01-19 DIAGNOSIS — F43.29 ADJUSTMENT DISORDER WITH MIXED EMOTIONAL FEATURES: Primary | ICD-10-CM

## 2021-01-19 PROCEDURE — 90837 PSYTX W PT 60 MINUTES: CPT | Mod: 95 | Performed by: MARRIAGE & FAMILY THERAPIST

## 2021-01-19 PROCEDURE — 99207 PR NO BILLABLE SERVICE THIS VISIT: CPT | Performed by: MARRIAGE & FAMILY THERAPIST

## 2021-01-24 NOTE — PROGRESS NOTES
[place] [facility] -  Case Progress Note    Date of Service: 21   Name: Herminia Garcia  : 1955  Medical Record Number: [id number]  Treating Provider: Diane Menendez, PhD, [facility]  Type of Session: Individual  Present in Session: Herminia  Number of Minutes:  55    Video start time: 7:00  Video end time: 7:55    Telemedicine Visit: The patient's condition can be safely assessed and treated via synchronous audio and visual telemedicine encounter.      Reason for Telemedicine Visit: Services only offered telehealth    Originating Site (Patient Location): Patient's home    Distant Site (Provider Location): Provider Remote Setting    Consent:  The patient/guardian has verbally consented to: the potential risks and benefits of telemedicine (video visit) versus in person care; bill my insurance or make self-payment for services provided; and responsibility for payment of non-covered services.     Mode of Communication:  Video Conference via mSpot    As the provider I attest to compliance with applicable laws and regulations related to telemedicine.      Current Symptoms/Status:  Sadness, anxiety, in reaction to stress or changes    Progress Toward Treatment Goals:   Making progress towards goals.    Intervention: Modality and Description:  SFT and supportive therapy. Herminia discussed engaging in nonverbal healing practices, such as participating in a drumming guided meditation. She noted her reactions to sound for managing her emotions and where that has come from. We discussed how to engage in healing through alternative means, such as physical activity, music.    Response to Intervention:  Open and cooperative    Assignment:  None at this time.        DSM-5 Diagnoses:  Diagnoses       Codes Comments    Adjustment disorder with mixed emotional features    -  Primary F43.29           Plan/Need for Future Services:  Return for therapy in 2 weeks to treat diagnosed problems.        Diane Menendez,  PhD, LMFT    TREATMENT PLAN    Date of Treatment Plan 2020  Name: Herminia Garcia  : 1955  Medical Record Number: [id number]  Treating Provider: Diane Menendez, PhD, ELIJAH        Current Status:    Depression/Mood:  Sad  Decreased Self-Esteem    Anxiety/Panic:  Worry  Psysiological Reactivity    Thought:   Reports no problems or symptoms at this time    Sensorium:  Reports no problems or symptoms at this time    Behavior/Health:  Reports no problems or symptoms at this time    Chemical Use:  Denies both Alcohol and Drug Abuse    Sexual Problems:  Reports no problems or symptoms at this time    Suicide Risk Assessment:  Assessed Level of Immediate Risk:  NO  Ideation:  NO  Plan:  NO  Means:  NOT APPLICABLE  Intent:  NO    Homicide Risk Assessment:  Assessed Level of Immediate Risk:  NOT REVIEWED  Ideation:  NOT REVIEWED  Plan:  NOT REVIEWED  Means:  NOT REVIEWED  Intent:  NOT REVIEWED    Impact of Symptoms on Function:  Decreased Social/Family Function    DSM-5 Diagnoses:   Diagnoses       Codes Comments    Adjustment disorder with mixed emotional features    -  Primary F43.29           Problem(s):  1. stress  2. Self-confidence  3. Panic when stressed    Short-Long Term Goals  Decrease Symptoms  Increase Coping  Increase Decision Making    Interventions  [facility]    Expected Outcomes and Prognosis  Expected improvement    Anticipated Treatment Duration:  1 year    Frequency of Sessions  2 x / Month    Progress Update (for Plan Update Only)  Compliant, Progressing and Improving - Needs More Sessions      Discharge & Aftercare Goals: To Be Determined.    Care Coordination: No    Consent to Treatment:     Patient participated in this treatment planning process and indicated verbal agreement with the above treatment plan.    Patient Signature: virtually agreed   Date: 2020      Provider Signature: Diane Menendez, PhD, ELIJAH  Date: 2020

## 2021-02-10 ENCOUNTER — VIRTUAL VISIT (OUTPATIENT)
Dept: PSYCHOLOGY | Facility: CLINIC | Age: 66
End: 2021-02-10
Payer: COMMERCIAL

## 2021-02-10 DIAGNOSIS — F43.29 ADJUSTMENT DISORDER WITH MIXED EMOTIONAL FEATURES: Primary | ICD-10-CM

## 2021-02-10 PROCEDURE — 90837 PSYTX W PT 60 MINUTES: CPT | Mod: 95 | Performed by: MARRIAGE & FAMILY THERAPIST

## 2021-02-15 NOTE — PROGRESS NOTES
[place] [facility] -  Case Progress Note    Date of Service: 2/10/21   Name: Herminia Garcia  : 1955  Medical Record Number: [id number]  Treating Provider: Diane Menendez, PhD, [facility]  Type of Session: Individual  Present in Session: Herminia  Number of Minutes:  55    Video start time: 5:02  Video end time: 5:57    Telemedicine Visit: The patient's condition can be safely assessed and treated via synchronous audio and visual telemedicine encounter.      Reason for Telemedicine Visit: Services only offered telehealth    Originating Site (Patient Location): Patient's home    Distant Site (Provider Location): Provider Remote Setting    Consent:  The patient/guardian has verbally consented to: the potential risks and benefits of telemedicine (video visit) versus in person care; bill my insurance or make self-payment for services provided; and responsibility for payment of non-covered services.     Mode of Communication:  Video Conference via [employer]    As the provider I attest to compliance with applicable laws and regulations related to telemedicine.      Current Symptoms/Status:  Sadness, anxiety, in reaction to stress or changes    Progress Toward Treatment Goals:   Making progress towards goals.    Intervention: Modality and Description:  SFT and supportive therapy. Herminia discussed her ongoing grief and processed the losses that came within a brief period of time (loss of mother, spouse's transition, both parents of spouse , her own health and lifestyle). She stated she is trying to understand when compartmentalizing is best versus living and experiencing her emotions. She processed this and these experiences in session    Response to Intervention:  Open and cooperative    Assignment:  None at this time.        DSM-5 Diagnoses:  Diagnoses       Codes Comments    Adjustment disorder with mixed emotional features    -  Primary F43.29           Plan/Need for Future Services:  Return for therapy  in 2 weeks to treat diagnosed problems.        Diane Menendez, PhD, [facility]    TREATMENT PLAN    Date of Treatment Plan 2020  Name: Herminia Garcia  : 1955  Medical Record Number: [id number]  Treating Provider: Diane Menendez, PhD, [facility]        Current Status:    Depression/Mood:  Sad  Decreased Self-Esteem    Anxiety/Panic:  Worry  Psysiological Reactivity    Thought:   Reports no problems or symptoms at this time    Sensorium:  Reports no problems or symptoms at this time    Behavior/Health:  Reports no problems or symptoms at this time    Chemical Use:  Denies both Alcohol and Drug Abuse    Sexual Problems:  Reports no problems or symptoms at this time    Suicide Risk Assessment:  Assessed Level of Immediate Risk:  NO  Ideation:  NO  Plan:  NO  Means:  NOT APPLICABLE  Intent:  NO    Homicide Risk Assessment:  Assessed Level of Immediate Risk:  NOT REVIEWED  Ideation:  NOT REVIEWED  Plan:  NOT REVIEWED  Means:  NOT REVIEWED  Intent:  NOT REVIEWED    Impact of Symptoms on Function:  Decreased Social/Family Function    DSM-5 Diagnoses:   Diagnoses       Codes Comments    Adjustment disorder with mixed emotional features    -  Primary F43.29           Problem(s):  1. stress  2. Self-confidence  3. Panic when stressed    Short-Long Term Goals  Decrease Symptoms  Increase Coping  Increase Decision Making    Interventions  [facility]    Expected Outcomes and Prognosis  Expected improvement    Anticipated Treatment Duration:  1 year    Frequency of Sessions  2 x / Month    Progress Update (for Plan Update Only)  Compliant, Progressing and Improving - Needs More Sessions      Discharge & Aftercare Goals: To Be Determined.    Care Coordination: No    Consent to Treatment:     Patient participated in this treatment planning process and indicated verbal agreement with the above treatment plan.    Patient Signature: virtually agreed   Date: 2020      Provider Signature: Diane MURRAY  Shon, PhD, LMFT  Date: 9/2/2020

## 2021-02-24 ENCOUNTER — VIRTUAL VISIT (OUTPATIENT)
Dept: PSYCHOLOGY | Facility: CLINIC | Age: 66
End: 2021-02-24
Payer: COMMERCIAL

## 2021-02-24 DIAGNOSIS — F43.29 ADJUSTMENT DISORDER WITH MIXED EMOTIONAL FEATURES: Primary | ICD-10-CM

## 2021-02-24 PROCEDURE — 90837 PSYTX W PT 60 MINUTES: CPT | Mod: 95 | Performed by: MARRIAGE & FAMILY THERAPIST

## 2021-03-01 NOTE — PROGRESS NOTES
[place] [facility] -  Case Progress Note    Date of Service: 21   Name: Herminia Garcia  : 1955  Medical Record Number: [id number]  Treating Provider: Diane Menendez, PhD, [facility]  Type of Session: Individual  Present in Session: Herminia  Number of Minutes:  56    Video start time: 5:02  Video end time: 5:58    Telemedicine Visit: The patient's condition can be safely assessed and treated via synchronous audio and visual telemedicine encounter.      Reason for Telemedicine Visit: Services only offered telehealth    Originating Site (Patient Location): Patient's home    Distant Site (Provider Location): Provider Remote Setting    Consent:  The patient/guardian has verbally consented to: the potential risks and benefits of telemedicine (video visit) versus in person care; bill my insurance or make self-payment for services provided; and responsibility for payment of non-covered services.     Mode of Communication:  Video Conference via [employer]    As the provider I attest to compliance with applicable laws and regulations related to telemedicine.      Current Symptoms/Status:  Sadness, anxiety, in reaction to stress or changes    Progress Toward Treatment Goals:   Making progress towards goals.    Intervention: Modality and Description:  SFT and supportive therapy. Herminia processed what is getting in the way of her being able to move through emotions/feelings related to end of her marriage. She stated that she is unable to be near ex and has to take extra steps to not be near him (e.g., involving third party to deliver tax info). She shared information from their past that she is holding onto. She appeared unsure about letting go of some of her concerns. She stated that she doesn't understand the cross-dressing/sexual arousal component and we discussed my understanding of this.    Herminia has begun writing group again but quit [employer].    Response to Intervention:  Open and  cooperative    Assignment:  None at this time.        DSM-5 Diagnoses:  Diagnoses       Codes Comments    Adjustment disorder with mixed emotional features    -  Primary F43.29           Plan/Need for Future Services:  Return for therapy in 2 weeks to treat diagnosed problems.        Diane Menendez, PhD, [facility]    TREATMENT PLAN    Date of Treatment Plan 2020  Name: Herminia Garcia  : 1955  Medical Record Number: [id number]  Treating Provider: Diane Menendez, PhD, [facility]        Current Status:    Depression/Mood:  Sad  Decreased Self-Esteem    Anxiety/Panic:  Worry  Psysiological Reactivity    Thought:   Reports no problems or symptoms at this time    Sensorium:  Reports no problems or symptoms at this time    Behavior/Health:  Reports no problems or symptoms at this time    Chemical Use:  Denies both Alcohol and Drug Abuse    Sexual Problems:  Reports no problems or symptoms at this time    Suicide Risk Assessment:  Assessed Level of Immediate Risk:  NO  Ideation:  NO  Plan:  NO  Means:  NOT APPLICABLE  Intent:  NO    Homicide Risk Assessment:  Assessed Level of Immediate Risk:  NOT REVIEWED  Ideation:  NOT REVIEWED  Plan:  NOT REVIEWED  Means:  NOT REVIEWED  Intent:  NOT REVIEWED    Impact of Symptoms on Function:  Decreased Social/Family Function    DSM-5 Diagnoses:   Diagnoses       Codes Comments    Adjustment disorder with mixed emotional features    -  Primary F43.29           Problem(s):  1. stress  2. Self-confidence  3. Panic when stressed    Short-Long Term Goals  Decrease Symptoms  Increase Coping  Increase Decision Making    Interventions  [facility]    Expected Outcomes and Prognosis  Expected improvement    Anticipated Treatment Duration:  1 year    Frequency of Sessions  2 x / Month    Progress Update (for Plan Update Only)  Compliant, Progressing and Improving - Needs More Sessions      Discharge & Aftercare Goals: To Be Determined.    Care Coordination:  No    Consent to Treatment:     Patient participated in this treatment planning process and indicated verbal agreement with the above treatment plan.    Patient Signature: virtually agreed   Date: 9/2/2020      Provider Signature: Diane Menendez, PhD, [facility]  Date: 9/2/2020

## 2021-03-10 ENCOUNTER — VIRTUAL VISIT (OUTPATIENT)
Dept: PSYCHOLOGY | Facility: CLINIC | Age: 66
End: 2021-03-10
Payer: COMMERCIAL

## 2021-03-10 DIAGNOSIS — F43.29 ADJUSTMENT DISORDER WITH MIXED EMOTIONAL FEATURES: Primary | ICD-10-CM

## 2021-03-10 PROCEDURE — 90837 PSYTX W PT 60 MINUTES: CPT | Mod: 95 | Performed by: MARRIAGE & FAMILY THERAPIST

## 2021-03-14 NOTE — PROGRESS NOTES
[place] [facility] -  Case Progress Note    Date of Service: 3/10/21   Name: Herminia Garcia  : 1955  Medical Record Number: [id number]  Treating Provider: Diane Menendez, PhD, [facility]  Type of Session: Individual  Present in Session: Herminia  Number of Minutes:  56    Video start time: 7:02  Video end time: 7:58    Telemedicine Visit: The patient's condition can be safely assessed and treated via synchronous audio and visual telemedicine encounter.      Reason for Telemedicine Visit: Services only offered telehealth    Originating Site (Patient Location): Patient's home    Distant Site (Provider Location): Provider Remote Setting    Consent:  The patient/guardian has verbally consented to: the potential risks and benefits of telemedicine (video visit) versus in person care; bill my insurance or make self-payment for services provided; and responsibility for payment of non-covered services.     Mode of Communication:  Video Conference via [employer]    As the provider I attest to compliance with applicable laws and regulations related to telemedicine.      Current Symptoms/Status:  Sadness, anxiety, in reaction to stress or changes    Progress Toward Treatment Goals:   Making progress towards goals.    Intervention: Modality and Description:  SFT and supportive therapy. Herminia processed her hurt and anger related to changes in her life, stating that she feels she has been in survival mode for about 8 years and it has taken it's toll. She processed ways she is attempting to process these hurts, including acknowledging them and meditation. She showed pictures of her earlier life and discussed what they mean to her.    Response to Intervention:  Open and cooperative    Assignment:  None at this time.        DSM-5 Diagnoses:  Diagnoses       Codes Comments    Adjustment disorder with mixed emotional features    -  Primary F43.29           Plan/Need for Future Services:  Return for therapy in 2 weeks to  treat diagnosed problems.        Diane Menendez, PhD, [facility]    TREATMENT PLAN    Date of Treatment Plan 2020  Name: Herminia Garcia  : 1955  Medical Record Number: [id number]  Treating Provider: Diane Menendez, PhD, [facility]        Current Status:    Depression/Mood:  Sad  Decreased Self-Esteem    Anxiety/Panic:  Worry  Psysiological Reactivity    Thought:   Reports no problems or symptoms at this time    Sensorium:  Reports no problems or symptoms at this time    Behavior/Health:  Reports no problems or symptoms at this time    Chemical Use:  Denies both Alcohol and Drug Abuse    Sexual Problems:  Reports no problems or symptoms at this time    Suicide Risk Assessment:  Assessed Level of Immediate Risk:  NO  Ideation:  NO  Plan:  NO  Means:  NOT APPLICABLE  Intent:  NO    Homicide Risk Assessment:  Assessed Level of Immediate Risk:  NOT REVIEWED  Ideation:  NOT REVIEWED  Plan:  NOT REVIEWED  Means:  NOT REVIEWED  Intent:  NOT REVIEWED    Impact of Symptoms on Function:  Decreased Social/Family Function    DSM-5 Diagnoses:   Diagnoses       Codes Comments    Adjustment disorder with mixed emotional features    -  Primary F43.29           Problem(s):  1. stress  2. Self-confidence  3. Panic when stressed    Short-Long Term Goals  Decrease Symptoms  Increase Coping  Increase Decision Making    Interventions  [facility]    Expected Outcomes and Prognosis  Expected improvement    Anticipated Treatment Duration:  1 year    Frequency of Sessions  2 x / Month    Progress Update (for Plan Update Only)  Compliant, Progressing and Improving - Needs More Sessions      Discharge & Aftercare Goals: To Be Determined.    Care Coordination: No    Consent to Treatment:     Patient participated in this treatment planning process and indicated verbal agreement with the above treatment plan.    Patient Signature: virtually agreed   Date: 2020      Provider Signature: Diane Menendez PhD,  LMFT  Date: 9/2/2020

## 2021-03-24 ENCOUNTER — VIRTUAL VISIT (OUTPATIENT)
Dept: PSYCHOLOGY | Facility: CLINIC | Age: 66
End: 2021-03-24
Payer: COMMERCIAL

## 2021-03-24 DIAGNOSIS — F43.29 ADJUSTMENT DISORDER WITH MIXED EMOTIONAL FEATURES: Primary | ICD-10-CM

## 2021-03-24 PROCEDURE — 90837 PSYTX W PT 60 MINUTES: CPT | Mod: 95 | Performed by: MARRIAGE & FAMILY THERAPIST

## 2021-03-24 PROCEDURE — 99207 PR NO BILLABLE SERVICE THIS VISIT: CPT | Performed by: MARRIAGE & FAMILY THERAPIST

## 2021-03-27 NOTE — PROGRESS NOTES
[place] [facility] -  Case Progress Note    Date of Service: 3/24/21   Name: Herminia Garcia  : 1955  Medical Record Number: [id number]  Treating Provider: Diane Menendez, PhD, [facility]  Type of Session: Individual  Present in Session: Herminia  Number of Minutes:  56    Video start time: 7:02  Video end time: 7:58    Telemedicine Visit: The patient's condition can be safely assessed and treated via synchronous audio and visual telemedicine encounter.      Reason for Telemedicine Visit: Services only offered telehealth    Originating Site (Patient Location): Patient's home    Distant Site (Provider Location): Provider Remote Setting    Consent:  The patient/guardian has verbally consented to: the potential risks and benefits of telemedicine (video visit) versus in person care; bill my insurance or make self-payment for services provided; and responsibility for payment of non-covered services.     Mode of Communication:  Video Conference via [employer]    As the provider I attest to compliance with applicable laws and regulations related to telemedicine.      Current Symptoms/Status:  Sadness, anxiety, in reaction to stress or changes    Progress Toward Treatment Goals:   Making progress towards goals.    Intervention: Modality and Description:  SFT and supportive therapy. Herminia stated that she has had several upsetting incidents over the past week, including interactions with ex, interaction with former sister-in-law, and being asked about vaccination at work. She shared how she is processing and we addressed her rights at work; how to look forward to future possibilities.     Response to Intervention:  Open and cooperative    Assignment:  None at this time.        DSM-5 Diagnoses:  Diagnoses       Codes Comments    Adjustment disorder with mixed emotional features    -  Primary F43.29           Plan/Need for Future Services:  Return for therapy in 2 weeks to treat diagnosed problems.        Diane MURRAY  Shon, PhD, JU[facility]    TREATMENT PLAN    Date of Treatment Plan 2020  Name: Herminia Garcia  : 1955  Medical Record Number: [id number]  Treating Provider: Diane Menendez, PhD, LMJUAN        Current Status:    Depression/Mood:  Sad  Decreased Self-Esteem    Anxiety/Panic:  Worry  Psysiological Reactivity    Thought:   Reports no problems or symptoms at this time    Sensorium:  Reports no problems or symptoms at this time    Behavior/Health:  Reports no problems or symptoms at this time    Chemical Use:  Denies both Alcohol and Drug Abuse    Sexual Problems:  Reports no problems or symptoms at this time    Suicide Risk Assessment:  Assessed Level of Immediate Risk:  NO  Ideation:  NO  Plan:  NO  Means:  NOT APPLICABLE  Intent:  NO    Homicide Risk Assessment:  Assessed Level of Immediate Risk:  NOT REVIEWED  Ideation:  NOT REVIEWED  Plan:  NOT REVIEWED  Means:  NOT REVIEWED  Intent:  NOT REVIEWED    Impact of Symptoms on Function:  Decreased Social/Family Function    DSM-5 Diagnoses:   Diagnoses       Codes Comments    Adjustment disorder with mixed emotional features    -  Primary F43.29           Problem(s):  1. stress  2. Self-confidence  3. Panic when stressed    Short-Long Term Goals  Decrease Symptoms  Increase Coping  Increase Decision Making    Interventions  [facility]    Expected Outcomes and Prognosis  Expected improvement    Anticipated Treatment Duration:  1 year    Frequency of Sessions  2 x / Month    Progress Update (for Plan Update Only)  Compliant, Progressing and Improving - Needs More Sessions      Discharge & Aftercare Goals: To Be Determined.    Care Coordination: No    Consent to Treatment:     Patient participated in this treatment planning process and indicated verbal agreement with the above treatment plan.    Patient Signature: virtually agreed   Date: 2020      Provider Signature: Diane Menendez, PhD, ELIJAH  Date: 2020

## 2021-04-13 ENCOUNTER — VIRTUAL VISIT (OUTPATIENT)
Dept: PSYCHOLOGY | Facility: CLINIC | Age: 66
End: 2021-04-13
Payer: COMMERCIAL

## 2021-04-13 DIAGNOSIS — F43.29 ADJUSTMENT DISORDER WITH MIXED EMOTIONAL FEATURES: Primary | ICD-10-CM

## 2021-04-13 PROCEDURE — 90837 PSYTX W PT 60 MINUTES: CPT | Mod: 95 | Performed by: MARRIAGE & FAMILY THERAPIST

## 2021-04-13 PROCEDURE — 99207 PR NO BILLABLE SERVICE THIS VISIT: CPT | Performed by: MARRIAGE & FAMILY THERAPIST

## 2021-04-14 NOTE — PROGRESS NOTES
[place] [facility] -  Case Progress Note    Date of Service: 21   Name: Herminia Garcia  : 1955  Medical Record Number: [id number]  Treating Provider: Diane Menendez, PhD, [facility]  Type of Session: Individual  Present in Session: Herminia  Number of Minutes:  58    Video start time: 7:00  Video end time: 7:58    Telemedicine Visit: The patient's condition can be safely assessed and treated via synchronous audio and visual telemedicine encounter.      Reason for Telemedicine Visit: Services only offered telehealth    Originating Site (Patient Location): Patient's home    Distant Site (Provider Location): Provider Remote Setting    Consent:  The patient/guardian has verbally consented to: the potential risks and benefits of telemedicine (video visit) versus in person care; bill my insurance or make self-payment for services provided; and responsibility for payment of non-covered services.     Mode of Communication:  Video Conference via [employer]    As the provider I attest to compliance with applicable laws and regulations related to telemedicine.      Current Symptoms/Status:  Sadness, anxiety, in reaction to stress or changes    Progress Toward Treatment Goals:   Making progress towards goals.    Intervention: Modality and Description:  SFT and supportive therapy. Herminia discussed stress management and ways to explore polyvagal theory and using strategies while at work. She shared some work related stressors and their impact. She also processed relationships with family members and how they have changed since her marital separation.     Response to Intervention:  Open and cooperative    Assignment:  None at this time.        DSM-5 Diagnoses:  Diagnoses       Codes Comments    Adjustment disorder with mixed emotional features    -  Primary F43.29           Plan/Need for Future Services:  Return for therapy in 2 weeks to treat diagnosed problems.        Diane Menendez, PhD, [facility]    TREATMENT  PLAN    Date of Treatment Plan 2020  Name: Herminia Garcia  : 1955  Medical Record Number: [id number]  Treating Provider: Diane Menendez, PhD, [facility]        Current Status:    Depression/Mood:  Sad  Decreased Self-Esteem    Anxiety/Panic:  Worry  Psysiological Reactivity    Thought:   Reports no problems or symptoms at this time    Sensorium:  Reports no problems or symptoms at this time    Behavior/Health:  Reports no problems or symptoms at this time    Chemical Use:  Denies both Alcohol and Drug Abuse    Sexual Problems:  Reports no problems or symptoms at this time    Suicide Risk Assessment:  Assessed Level of Immediate Risk:  NO  Ideation:  NO  Plan:  NO  Means:  NOT APPLICABLE  Intent:  NO    Homicide Risk Assessment:  Assessed Level of Immediate Risk:  NOT REVIEWED  Ideation:  NOT REVIEWED  Plan:  NOT REVIEWED  Means:  NOT REVIEWED  Intent:  NOT REVIEWED    Impact of Symptoms on Function:  Decreased Social/Family Function    DSM-5 Diagnoses:   Diagnoses       Codes Comments    Adjustment disorder with mixed emotional features    -  Primary F43.29           Problem(s):  1. stress  2. Self-confidence  3. Panic when stressed    Short-Long Term Goals  Decrease Symptoms  Increase Coping  Increase Decision Making    Interventions  [facility]    Expected Outcomes and Prognosis  Expected improvement    Anticipated Treatment Duration:  1 year    Frequency of Sessions  2 x / Month    Progress Update (for Plan Update Only)  Compliant, Progressing and Improving - Needs More Sessions      Discharge & Aftercare Goals: To Be Determined.    Care Coordination: No    Consent to Treatment:     Patient participated in this treatment planning process and indicated verbal agreement with the above treatment plan.    Patient Signature: virtually agreed   Date: 2020      Provider Signature: Diane Menendez, PhD, [facility]  Date: 2020

## 2021-05-18 ENCOUNTER — VIRTUAL VISIT (OUTPATIENT)
Dept: PSYCHOLOGY | Facility: CLINIC | Age: 66
End: 2021-05-18
Payer: COMMERCIAL

## 2021-05-18 DIAGNOSIS — F43.29 ADJUSTMENT DISORDER WITH MIXED EMOTIONAL FEATURES: Primary | ICD-10-CM

## 2021-05-18 PROCEDURE — 90837 PSYTX W PT 60 MINUTES: CPT | Mod: 95 | Performed by: MARRIAGE & FAMILY THERAPIST

## 2021-05-18 PROCEDURE — 99207 PR NO BILLABLE SERVICE THIS VISIT: CPT | Performed by: MARRIAGE & FAMILY THERAPIST

## 2021-05-19 NOTE — PROGRESS NOTES
[facility] -  Case Progress Note    Date of Service: 21   Name: Herminia Garcia  : 1955  Medical Record Number: [id number]  Treating Provider: Diane Menendez, PhD, [facility]  Type of Session: Individual  Present in Session: Herminia  Number of Minutes:  58    Video start time: 4:00  Video end time: 4:58    Telemedicine Visit: The patient's condition can be safely assessed and treated via synchronous audio and visual telemedicine encounter.      Reason for Telemedicine Visit: Services only offered telehealth    Originating Site (Patient Location): Patient's home    Distant Site (Provider Location): Provider Remote Setting    Consent:  The patient/guardian has verbally consented to: the potential risks and benefits of telemedicine (video visit) versus in person care; bill my insurance or make self-payment for services provided; and responsibility for payment of non-covered services.     Mode of Communication:  Video Conference via [employer]    As the provider I attest to compliance with applicable laws and regulations related to telemedicine.      Current Symptoms/Status:  Sadness, anxiety, in reaction to stress or changes    Progress Toward Treatment Goals:   Making progress towards goals.    Intervention: Modality and Description:  SFT and supportive therapy. Herminia discussed changes in family (son moving to discontinue), less work stress, feeling alone after socializing, and food challenges that create a sense of distance with others. We discussed how each of these contribute to sadness and fears. In particular, she spoke about catastrophizing after need for rest that she will follow her mother's footsteps and lose her memory and then die. She was challenged on this train of thought and not allowing herself rest when needed.     Response to Intervention:  Open and cooperative    Assignment:  None at this time.        DSM-5 Diagnoses:  Diagnoses       Codes Comments    Adjustment disorder  with mixed emotional features    -  Primary F43.29           Plan/Need for Future Services:  Return for therapy in 2 weeks to treat diagnosed problems.        Diane Menendez, PhD, [facility]    TREATMENT PLAN    Date of Treatment Plan 2020  Name: Herminia Gracia  : 1955  Medical Record Number: [id number]  Treating Provider: Diane Menendez, PhD, [facility]        Current Status:    Depression/Mood:  Sad  Decreased Self-Esteem    Anxiety/Panic:  Worry  Psysiological Reactivity    Thought:   Reports no problems or symptoms at this time    Sensorium:  Reports no problems or symptoms at this time    Behavior/Health:  Reports no problems or symptoms at this time    Chemical Use:  Denies both Alcohol and Drug Abuse    Sexual Problems:  Reports no problems or symptoms at this time    Suicide Risk Assessment:  Assessed Level of Immediate Risk:  NO  Ideation:  NO  Plan:  NO  Means:  NOT APPLICABLE  Intent:  NO    Homicide Risk Assessment:  Assessed Level of Immediate Risk:  NOT REVIEWED  Ideation:  NOT REVIEWED  Plan:  NOT REVIEWED  Means:  NOT REVIEWED  Intent:  NOT REVIEWED    Impact of Symptoms on Function:  Decreased Social/Family Function    DSM-5 Diagnoses:   Diagnoses       Codes Comments    Adjustment disorder with mixed emotional features    -  Primary F43.29           Problem(s):  1. stress  2. Self-confidence  3. Panic when stressed    Short-Long Term Goals  Decrease Symptoms  Increase Coping  Increase Decision Making    Interventions  [facility]    Expected Outcomes and Prognosis  Expected improvement    Anticipated Treatment Duration:  1 year    Frequency of Sessions  2 x / Month    Progress Update (for Plan Update Only)  Compliant, Progressing and Improving - Needs More Sessions      Discharge & Aftercare Goals: To Be Determined.    Care Coordination: No    Consent to Treatment:     Patient participated in this treatment planning process and indicated verbal agreement with the above  treatment plan.    Patient Signature: virtually agreed   Date: 9/2/2020      Provider Signature: Diane Menendez, PhD, [facility]  Date: 9/2/2020

## 2021-06-01 ENCOUNTER — VIRTUAL VISIT (OUTPATIENT)
Dept: PSYCHOLOGY | Facility: CLINIC | Age: 66
End: 2021-06-01
Payer: COMMERCIAL

## 2021-06-01 DIAGNOSIS — F43.29 ADJUSTMENT DISORDER WITH MIXED EMOTIONAL FEATURES: Primary | ICD-10-CM

## 2021-06-01 PROCEDURE — 99207 PR NO BILLABLE SERVICE THIS VISIT: CPT | Performed by: MARRIAGE & FAMILY THERAPIST

## 2021-06-01 PROCEDURE — 90837 PSYTX W PT 60 MINUTES: CPT | Mod: 95 | Performed by: MARRIAGE & FAMILY THERAPIST

## 2021-06-02 NOTE — PROGRESS NOTES
[facility] -  Case Progress Note    Date of Service: 21   Name: Herminia Garcia  : 1955  Medical Record Number: [id number]  Treating Provider: Diane Menendez, PhD, [facility]  Type of Session: Individual  Present in Session: Herminia  Number of Minutes:  59    Video start time: 6:00  Video end time: 6:59    Telemedicine Visit: The patient's condition can be safely assessed and treated via synchronous audio and visual telemedicine encounter.      Reason for Telemedicine Visit: Services only offered telehealth    Originating Site (Patient Location): Patient's home    Distant Site (Provider Location): Provider Remote Setting    Consent:  The patient/guardian has verbally consented to: the potential risks and benefits of telemedicine (video visit) versus in person care; bill my insurance or make self-payment for services provided; and responsibility for payment of non-covered services.     Mode of Communication:  Video Conference via [employer]    As the provider I attest to compliance with applicable laws and regulations related to telemedicine.      Current Symptoms/Status:  Sadness, anxiety, in reaction to stress or changes    Progress Toward Treatment Goals:   Making progress towards goals.    Intervention: Modality and Description:  SFT and supportive therapy. Herminia discussed work related stress and her boss quitting. She stated that she feels less emotionally safe at work now that he is gone. She also reported microagressions related to her age. We processed how this has contributed to her stress level. We processed how to promote a self-image of a confident older woman. She expressed some concerns that she is not processing her marital separation enough, but also stated that she does not want to go there at this time.    Response to Intervention:  Open and cooperative    Assignment:  None at this time.        DSM-5 Diagnoses:  Diagnoses       Codes Comments    Adjustment disorder with  mixed emotional features    -  Primary F43.29           Plan/Need for Future Services:  Return for therapy in 2 weeks to treat diagnosed problems.        Diane Menendez, PhD, [facility]    TREATMENT PLAN    Date of Treatment Plan 2020  Name: Herminia Garcia  : 1955  Medical Record Number: [id number]  Treating Provider: Diane Menendez, PhD, [facility]        Current Status:    Depression/Mood:  Sad  Decreased Self-Esteem    Anxiety/Panic:  Worry  Psysiological Reactivity    Thought:   Reports no problems or symptoms at this time    Sensorium:  Reports no problems or symptoms at this time    Behavior/Health:  Reports no problems or symptoms at this time    Chemical Use:  Denies both Alcohol and Drug Abuse    Sexual Problems:  Reports no problems or symptoms at this time    Suicide Risk Assessment:  Assessed Level of Immediate Risk:  NO  Ideation:  NO  Plan:  NO  Means:  NOT APPLICABLE  Intent:  NO    Homicide Risk Assessment:  Assessed Level of Immediate Risk:  NOT REVIEWED  Ideation:  NOT REVIEWED  Plan:  NOT REVIEWED  Means:  NOT REVIEWED  Intent:  NOT REVIEWED    Impact of Symptoms on Function:  Decreased Social/Family Function    DSM-5 Diagnoses:   Diagnoses       Codes Comments    Adjustment disorder with mixed emotional features    -  Primary F43.29           Problem(s):  1. stress  2. Self-confidence  3. Panic when stressed    Short-Long Term Goals  Decrease Symptoms  Increase Coping  Increase Decision Making    Interventions  [facility]    Expected Outcomes and Prognosis  Expected improvement    Anticipated Treatment Duration:  1 year    Frequency of Sessions  2 x / Month    Progress Update (for Plan Update Only)  Compliant, Progressing and Improving - Needs More Sessions      Discharge & Aftercare Goals: To Be Determined.    Care Coordination: No    Consent to Treatment:     Patient participated in this treatment planning process and indicated verbal agreement with the above  treatment plan.    Patient Signature: virtually agreed   Date: 9/2/2020      Provider Signature: Diane Menendez, PhD, [facility]  Date: 9/2/2020

## 2021-06-16 ENCOUNTER — VIRTUAL VISIT (OUTPATIENT)
Dept: PSYCHOLOGY | Facility: CLINIC | Age: 66
End: 2021-06-16
Payer: COMMERCIAL

## 2021-06-16 DIAGNOSIS — F43.29 ADJUSTMENT DISORDER WITH MIXED EMOTIONAL FEATURES: Primary | ICD-10-CM

## 2021-06-16 PROCEDURE — 99207 PR NO BILLABLE SERVICE THIS VISIT: CPT | Performed by: MARRIAGE & FAMILY THERAPIST

## 2021-06-16 PROCEDURE — 90837 PSYTX W PT 60 MINUTES: CPT | Mod: 95 | Performed by: MARRIAGE & FAMILY THERAPIST

## 2021-06-18 NOTE — PROGRESS NOTES
[facility] -  Case Progress Note    Date of Service: 21   Name: Herminia Garcia  : 1955  Medical Record Number: [id number]  Treating Provider: Diane Menendez, PhD, LM  Type of Session: Individual  Present in Session: Herminia  Number of Minutes:  57    Video start time: 3:00  Video end time: 3:57    Telemedicine Visit: The patient's condition can be safely assessed and treated via synchronous audio and visual telemedicine encounter.      Reason for Telemedicine Visit: Services only offered telehealth    Originating Site (Patient Location): Patient's home    Distant Site (Provider Location): Provider Remote Setting    Consent:  The patient/guardian has verbally consented to: the potential risks and benefits of telemedicine (video visit) versus in person care; bill my insurance or make self-payment for services provided; and responsibility for payment of non-covered services.     Mode of Communication:  Video Conference via [employer]    As the provider I attest to compliance with applicable laws and regulations related to telemedicine.      Current Symptoms/Status:  Sadness, anxiety, in reaction to stress or changes    Progress Toward Treatment Goals:   Making progress towards goals.    Intervention: Modality and Description:  SFT and supportive therapy. Herminia discussed work related stress, interactions with former spouse, and feelings of stuckness. She noted that she continues to have an adverse reaction to spouse when having to interact. We discussed a balance between honoring own feelings and experiences, with letting go of feelings that are not serving oneself. She discussed feeling pressured by others to feel a certain way - she was challenged to think about ways that that experience may keep her wedded to current interpretation of feelings.    Response to Intervention:  Open and cooperative    Assignment:  None at this time.        DSM-5 Diagnoses:  Diagnoses       Codes  Comments    Adjustment disorder with mixed emotional features    -  Primary F43.29           Plan/Need for Future Services:  Return for therapy in 2 weeks to treat diagnosed problems.        Diane Menendez, PhD, [facility]    TREATMENT PLAN    Date of Treatment Plan 2020  Name: Herminia Garcia  : 1955  Medical Record Number: [id number]  Treating Provider: Diane Menendez, PhD, [facility]        Current Status:    Depression/Mood:  Sad  Decreased Self-Esteem    Anxiety/Panic:  Worry  Psysiological Reactivity    Thought:   Reports no problems or symptoms at this time    Sensorium:  Reports no problems or symptoms at this time    Behavior/Health:  Reports no problems or symptoms at this time    Chemical Use:  Denies both Alcohol and Drug Abuse    Sexual Problems:  Reports no problems or symptoms at this time    Suicide Risk Assessment:  Assessed Level of Immediate Risk:  NO  Ideation:  NO  Plan:  NO  Means:  NOT APPLICABLE  Intent:  NO    Homicide Risk Assessment:  Assessed Level of Immediate Risk:  NOT REVIEWED  Ideation:  NOT REVIEWED  Plan:  NOT REVIEWED  Means:  NOT REVIEWED  Intent:  NOT REVIEWED    Impact of Symptoms on Function:  Decreased Social/Family Function    DSM-5 Diagnoses:   Diagnoses       Codes Comments    Adjustment disorder with mixed emotional features    -  Primary F43.29           Problem(s):  1. stress  2. Self-confidence  3. Panic when stressed    Short-Long Term Goals  Decrease Symptoms  Increase Coping  Increase Decision Making    Interventions  [facility]    Expected Outcomes and Prognosis  Expected improvement    Anticipated Treatment Duration:  1 year    Frequency of Sessions  2 x / Month    Progress Update (for Plan Update Only)  Compliant, Progressing and Improving - Needs More Sessions      Discharge & Aftercare Goals: To Be Determined.    Care Coordination: No    Consent to Treatment:     Patient participated in this treatment planning process and indicated  verbal agreement with the above treatment plan.    Patient Signature: virtually agreed   Date: 9/2/2020      Provider Signature: Diane Menendez, PhD, LM  Date: 9/2/2020

## 2021-06-30 ENCOUNTER — VIRTUAL VISIT (OUTPATIENT)
Dept: PSYCHOLOGY | Facility: CLINIC | Age: 66
End: 2021-06-30
Payer: COMMERCIAL

## 2021-06-30 DIAGNOSIS — F43.29 ADJUSTMENT DISORDER WITH MIXED EMOTIONAL FEATURES: Primary | ICD-10-CM

## 2021-06-30 PROCEDURE — 99207 PR NO BILLABLE SERVICE THIS VISIT: CPT | Performed by: MARRIAGE & FAMILY THERAPIST

## 2021-06-30 PROCEDURE — 90837 PSYTX W PT 60 MINUTES: CPT | Mod: 95 | Performed by: MARRIAGE & FAMILY THERAPIST

## 2021-07-01 NOTE — PROGRESS NOTES
[facility] -  [facility]    Date of Service: 21   Name: Herminia Garcia  : 1955  Medical Record Number: [id number]  Treating Provider: Diane Menendez, PhD, LM  Type of Session: Individual  Present in Session: Herminia  Number of Minutes:  57    Video start time: 5:02  Video end time: 5:59    Telemedicine Visit: The patient's condition can be safely assessed and treated via synchronous audio and visual telemedicine encounter.      Reason for Telemedicine Visit: Services only offered telehealth    Originating Site (Patient Location): Patient's home    Distant Site (Provider Location): Provider Remote Setting    Consent:  The patient/guardian has verbally consented to: the potential risks and benefits of telemedicine (video visit) versus in person care; bill my insurance or make self-payment for services provided; and responsibility for payment of non-covered services.     Mode of Communication:  Video Conference via [employer]    As the provider I attest to compliance with applicable laws and regulations related to telemedicine.      Current Symptoms/Status:  Sadness, anxiety, in reaction to stress or changes    Progress Toward Treatment Goals:   Making progress towards goals.    Intervention: Modality and Description:  SFT and supportive therapy. Herminia processed \"we-ness\" and belonging - noting the many ways she has lost her support system since the marital separation and the newness of this for her. She read text messages of her responding to well meaning unsolicited advice - she was given space to manage feelings related to this. Her friend chose to not stay with her because she is unvaccinated - she provided examples of feeling judged and unsure how to manage more changes in relationships because of vaccine topics. Herminia noted toxic positivity in her writing group.    Response to Intervention:  Open and cooperative    Assignment:  None at this time.        DSM-5 " Diagnoses:  Diagnoses       Codes Comments    Adjustment disorder with mixed emotional features    -  Primary F43.29           Plan/Need for Future Services:  Return for therapy in 2 weeks to treat diagnosed problems.        Diane Menendez, PhD, [facility]    TREATMENT PLAN    Date of Treatment Plan 2020  Name: Herminia Garcia  : 1955  Medical Record Number: [id number]  Treating Provider: Diane Menendez, PhD, [facility]        Current Status:    Depression/Mood:  Sad  Decreased Self-Esteem    Anxiety/Panic:  Worry  Psysiological Reactivity    Thought:   Reports no problems or symptoms at this time    Sensorium:  Reports no problems or symptoms at this time    Behavior/Health:  Reports no problems or symptoms at this time    Chemical Use:  Denies both Alcohol and Drug Abuse    Sexual Problems:  Reports no problems or symptoms at this time    Suicide Risk Assessment:  Assessed Level of Immediate Risk:  NO  Ideation:  NO  Plan:  NO  Means:  NOT APPLICABLE  Intent:  NO    Homicide Risk Assessment:  Assessed Level of Immediate Risk:  NOT REVIEWED  Ideation:  NOT REVIEWED  Plan:  NOT REVIEWED  Means:  NOT REVIEWED  Intent:  NOT REVIEWED    Impact of Symptoms on Function:  Decreased Social/Family Function    DSM-5 Diagnoses:   Diagnoses       Codes Comments    Adjustment disorder with mixed emotional features    -  Primary F43.29           Problem(s):  1. stress  2. Self-confidence  3. Panic when stressed    Short-Long Term Goals  Decrease Symptoms  Increase Coping  Increase Decision Making    Interventions  [facility]    Expected Outcomes and Prognosis  Expected improvement    Anticipated Treatment Duration:  1 year    Frequency of Sessions  2 x / Month    Progress Update (for Plan Update Only)  Compliant, Progressing and Improving - Needs More Sessions      Discharge & Aftercare Goals: To Be Determined.    Care Coordination: No    Consent to Treatment:     Patient participated in this treatment  planning process and indicated verbal agreement with the above treatment plan.    Patient Signature: virtually agreed   Date: 9/2/2020      Provider Signature: Diane Menendez, PhD, [facility]  Date: 9/2/2020

## 2021-08-03 ENCOUNTER — VIRTUAL VISIT (OUTPATIENT)
Dept: PSYCHOLOGY | Facility: CLINIC | Age: 66
End: 2021-08-03
Payer: COMMERCIAL

## 2021-08-03 DIAGNOSIS — F43.29 ADJUSTMENT DISORDER WITH MIXED EMOTIONAL FEATURES: Primary | ICD-10-CM

## 2021-08-03 PROCEDURE — 90837 PSYTX W PT 60 MINUTES: CPT | Mod: 95 | Performed by: MARRIAGE & FAMILY THERAPIST

## 2021-08-03 PROCEDURE — 99207 PR NO BILLABLE SERVICE THIS VISIT: CPT | Performed by: MARRIAGE & FAMILY THERAPIST

## 2021-08-04 NOTE — PROGRESS NOTES
[facility] -  Case Progress Note    Date of Service: 21   Name: Herminia Garcia  : 1955  Medical Record Number: [id number]  Treating Provider: Diane Menendez, PhD, [facility]  Type of Session: Individual  Present in Session: Herminia  Number of Minutes:  55  Do tx plan at next session    Video start time: 5:00  Video end time: 5:55    Telemedicine Visit: The patient's condition can be safely assessed and treated via synchronous audio and visual telemedicine encounter.      Reason for Telemedicine Visit: Services only offered telehealth    Originating Site (Patient Location): Patient's home    Distant Site (Provider Location): Provider Remote Setting    Consent:  The patient/guardian has verbally consented to: the potential risks and benefits of telemedicine (video visit) versus in person care; bill my insurance or make self-payment for services provided; and responsibility for payment of non-covered services.     Mode of Communication:  Video Conference via [employer]    As the provider I attest to compliance with applicable laws and regulations related to telemedicine.      Current Symptoms/Status:  Sadness, anxiety, in reaction to stress or changes    Progress Toward Treatment Goals:   Making progress towards goals.    Intervention: Modality and Description:  SFT and supportive therapy. Herminia processed feelings of being pressured to conform in ways that are not true to her values. We explored ways of actively engaging in coping with those pressures. She also processed transitions to parenting adult children. She noted that she is closer to shelter and feels more confident financially.     Response to Intervention:  Open and cooperative    Assignment:  None at this time.        DSM-5 Diagnoses:  Diagnoses       Codes Comments    Adjustment disorder with mixed emotional features    -  Primary F43.29           Plan/Need for Future Services:  Return for therapy in 2 weeks to treat diagnosed  problems.        Diane Menendez, PhD, [facility]    TREATMENT PLAN    Date of Treatment Plan 2020  Name: Herminia Garcia  : 1955  Medical Record Number: [id number]  Treating Provider: Diane Menendez, PhD, [facility]        Current Status:    Depression/Mood:  Sad  Decreased Self-Esteem    Anxiety/Panic:  Worry  Psysiological Reactivity    Thought:   Reports no problems or symptoms at this time    Sensorium:  Reports no problems or symptoms at this time    Behavior/Health:  Reports no problems or symptoms at this time    Chemical Use:  Denies both Alcohol and Drug Abuse    Sexual Problems:  Reports no problems or symptoms at this time    Suicide Risk Assessment:  Assessed Level of Immediate Risk:  NO  Ideation:  NO  Plan:  NO  Means:  NOT APPLICABLE  Intent:  NO    Homicide Risk Assessment:  Assessed Level of Immediate Risk:  NOT REVIEWED  Ideation:  NOT REVIEWED  Plan:  NOT REVIEWED  Means:  NOT REVIEWED  Intent:  NOT REVIEWED    Impact of Symptoms on Function:  Decreased Social/Family Function    DSM-5 Diagnoses:   Diagnoses       Codes Comments    Adjustment disorder with mixed emotional features    -  Primary F43.29           Problem(s):  1. stress  2. Self-confidence  3. Panic when stressed    Short-Long Term Goals  Decrease Symptoms  Increase Coping  Increase Decision Making    Interventions  [place] [facility]    Expected Outcomes and Prognosis  Expected improvement    Anticipated Treatment Duration:  1 year    Frequency of Sessions  2 x / Month    Progress Update (for Plan Update Only)  Compliant, Progressing and Improving - Needs More Sessions      Discharge & Aftercare Goals: To Be Determined.    Care Coordination: No    Consent to Treatment:     Patient participated in this treatment planning process and indicated verbal agreement with the above treatment plan.    Patient Signature: virtually agreed   Date: 2020      Provider Signature: Diane Menendez PhD, LMJUAN  Date: 2020

## 2021-08-17 ENCOUNTER — VIRTUAL VISIT (OUTPATIENT)
Dept: PSYCHOLOGY | Facility: CLINIC | Age: 66
End: 2021-08-17
Payer: COMMERCIAL

## 2021-08-17 DIAGNOSIS — F43.29 ADJUSTMENT DISORDER WITH MIXED EMOTIONAL FEATURES: Primary | ICD-10-CM

## 2021-08-17 PROCEDURE — 90837 PSYTX W PT 60 MINUTES: CPT | Mod: 95 | Performed by: MARRIAGE & FAMILY THERAPIST

## 2021-08-17 PROCEDURE — 99207 PR NO BILLABLE SERVICE THIS VISIT: CPT | Performed by: MARRIAGE & FAMILY THERAPIST

## 2021-08-31 ENCOUNTER — VIRTUAL VISIT (OUTPATIENT)
Dept: PSYCHOLOGY | Facility: CLINIC | Age: 66
End: 2021-08-31
Payer: COMMERCIAL

## 2021-08-31 DIAGNOSIS — F43.29 ADJUSTMENT DISORDER WITH MIXED EMOTIONAL FEATURES: Primary | ICD-10-CM

## 2021-08-31 PROCEDURE — 99207 PR NO BILLABLE SERVICE THIS VISIT: CPT | Performed by: MARRIAGE & FAMILY THERAPIST

## 2021-08-31 PROCEDURE — 90837 PSYTX W PT 60 MINUTES: CPT | Mod: 95 | Performed by: MARRIAGE & FAMILY THERAPIST

## 2021-09-03 NOTE — PROGRESS NOTES
[facility] -  Case Progress Note    Date of Service: 21   Name: Herminia Garcia  : 1955  Medical Record Number: [id number]  Treating Provider: Diane Menendez, PhD, [facility]  Type of Session: Individual  Present in Session: Herminia  Number of Minutes:  55   tx plan revised and updated below - 21    Video start time: 5:00  Video end time: 5:55        Telemedicine Visit: The patient's condition can be safely assessed and treated via synchronous audio and visual telemedicine encounter.      Reason for Telemedicine Visit: Services only offered telehealth    Originating Site (Patient Location): Patient's home    Distant Site (Provider Location): Provider Remote Setting    Consent:  The patient/guardian has verbally consented to: the potential risks and benefits of telemedicine (video visit) versus in person care; bill my insurance or make self-payment for services provided; and responsibility for payment of non-covered services.     Mode of Communication:  Video Conference via doxy    As the provider I attest to compliance with applicable laws and regulations related to telemedicine.      Current Symptoms/Status:  Sadness, anxiety, in reaction to stress or changes    Progress Toward Treatment Goals:   Making progress towards goals.    Intervention: Modality and Description:  SFT and supportive therapy. Herminia processed suggestion from last session to notice her reactions to experiences and we discussed this as a means of listening to intuition. We discussed mindfulness to notice emotions, without pushing to follow one method or right way of doing things.      Response to Intervention:  Open and cooperative    Assignment:  None at this time.        DSM-5 Diagnoses:  Diagnoses       Codes Comments    Adjustment disorder with mixed emotional features    -  Primary F43.29           Plan/Need for Future Services:  Return for therapy in 2 weeks to treat diagnosed problems.        Diane Menendez,  PhD, LMFT    TREATMENT PLAN    Date of Treatment Plan 21 - reviewed; remain same goals - remove one problem (panic when stressed) as resolved  Name: Herminia Garcia  : 1955  Medical Record Number: [id number]  Treating Provider: Diane Menendez, PhD, [facility]        Current Status:    Depression/Mood:  Sad  Decreased Self-Esteem    Anxiety/Panic:  Worry  Psysiological Reactivity    Thought:   Reports no problems or symptoms at this time    Sensorium:  Reports no problems or symptoms at this time    Behavior/Health:  Reports no problems or symptoms at this time    Chemical Use:  Denies both Alcohol and Drug Abuse    Sexual Problems:  Reports no problems or symptoms at this time    Suicide Risk Assessment:  Assessed Level of Immediate Risk:  NO  Ideation:  NO  Plan:  NO  Means:  NOT APPLICABLE  Intent:  NO    Homicide Risk Assessment:  Assessed Level of Immediate Risk:  NOT REVIEWED  Ideation:  NOT REVIEWED  Plan:  NOT REVIEWED  Means:  NOT REVIEWED  Intent:  NOT REVIEWED    Impact of Symptoms on Function:  Decreased Social/Family Function    DSM-5 Diagnoses:   Diagnoses       Codes Comments    Adjustment disorder with mixed emotional features    -  Primary F43.29           Problem(s):  1. stress  2. Self-confidence      Short-Long Term Goals  Decrease Symptoms  Increase Coping/management of stress and emotions  Increase Decision Making    Interventions  [facility] [place] [facility]    Expected Outcomes and Prognosis  Expected improvement    Anticipated Treatment Duration:  1 year    Frequency of Sessions  2 x / Month    Progress Update (for Plan Update Only)  Compliant, Progressing and Improving - Needs More Sessions      Discharge & Aftercare Goals: To Be Determined.    Care Coordination: No    Consent to Treatment:     Patient participated in this treatment planning process and indicated verbal agreement with the above treatment plan.    Patient Signature: virtually agreed   Date: 2021      Provider  Signature: Diane Menendez, PhD, LMFT  Date: 8/17/2021

## 2021-09-05 NOTE — PROGRESS NOTES
[facility] -  Case Progress Note    Date of Service: 21   Name: Herminia Garcia  : 1955  Medical Record Number: [id number]  Treating Provider: Diane Menendez, PhD, [facility]  Type of Session: Individual  Present in Session: Herminia  Number of Minutes:  55   tx plan revised and updated below - 21    Video start time: 5:00  Video end time: 5:55        Telemedicine Visit: The patient's condition can be safely assessed and treated via synchronous audio and visual telemedicine encounter.      Reason for Telemedicine Visit: Services only offered telehealth    Originating Site (Patient Location): Patient's home    Distant Site (Provider Location): Provider Remote Setting    Consent:  The patient/guardian has verbally consented to: the potential risks and benefits of telemedicine (video visit) versus in person care; bill my insurance or make self-payment for services provided; and responsibility for payment of non-covered services.     Mode of Communication:  Video Conference via doxy    As the provider I attest to compliance with applicable laws and regulations related to telemedicine.      Current Symptoms/Status:  Sadness, anxiety, in reaction to stress or changes    Progress Toward Treatment Goals:   Making progress towards goals.    Intervention: Modality and Description:  SFT and supportive therapy. We reviewed progress and treatment plan today. We changed the frequency to be 1-2 x/mo, instead of 2x month due to progress. We discussed what upcoming transitions that may be happening (custodial, legal divorce) and how those influence progress made. Herminia processed how writing has contributed to positive changes as well as being disappointed at times. She noted her isolation for the past couple of years and a lack of spiritual community. We discussed finding spaces to address those concerns.      Response to Intervention:  Open and cooperative    Assignment:  None at this time.        DSM-5  Diagnoses:  Diagnoses       Codes Comments    Adjustment disorder with mixed emotional features    -  Primary F43.29           Plan/Need for Future Services:  Return for therapy in 2 weeks to treat diagnosed problems.        Diane Menendez, PhD, [facility]    TREATMENT PLAN    Date of Treatment Plan 21 - reviewed; remain same goals - remove one problem (panic when stressed) as resolved  Name: Herminia Garcia  : 1955  Medical Record Number: [id number]  Treating Provider: Diane Menendez, PhD, [facility]        Current Status:    Depression/Mood:  Sad  Decreased Self-Esteem    Anxiety/Panic:  Worry  Psysiological Reactivity    Thought:   Reports no problems or symptoms at this time    Sensorium:  Reports no problems or symptoms at this time    Behavior/Health:  Reports no problems or symptoms at this time    Chemical Use:  Denies both Alcohol and Drug Abuse    Sexual Problems:  Reports no problems or symptoms at this time    Suicide Risk Assessment:  Assessed Level of Immediate Risk:  NO  Ideation:  NO  Plan:  NO  Means:  NOT APPLICABLE  Intent:  NO    Homicide Risk Assessment:  Assessed Level of Immediate Risk:  NOT REVIEWED  Ideation:  NOT REVIEWED  Plan:  NOT REVIEWED  Means:  NOT REVIEWED  Intent:  NOT REVIEWED    Impact of Symptoms on Function:  Decreased Social/Family Function    DSM-5 Diagnoses:   Diagnoses       Codes Comments    Adjustment disorder with mixed emotional features    -  Primary F43.29           Problem(s):  1. stress  2. Self-confidence      Short-Long Term Goals  Decrease Symptoms  Increase Coping/management of stress and emotions  Increase Decision Making    Interventions  [facility] [facility]    Expected Outcomes and Prognosis  Expected improvement    Anticipated Treatment Duration:  1 year    Frequency of Sessions  2 x / Month    Progress Update (for Plan Update Only)  Compliant, Progressing and Improving - Needs More Sessions      Discharge & Aftercare Goals: To Be  Determined.    Care Coordination: No    Consent to Treatment:     Patient participated in this treatment planning process and indicated verbal agreement with the above treatment plan.    Patient Signature: virtually agreed   Date: 8/17/2021      Provider Signature: Diane Menendez, PhD, LMFT  Date: 8/17/2021   </= 50% for >/= 5 days

## 2021-09-14 ENCOUNTER — VIRTUAL VISIT (OUTPATIENT)
Dept: PSYCHOLOGY | Facility: CLINIC | Age: 66
End: 2021-09-14
Payer: COMMERCIAL

## 2021-09-14 DIAGNOSIS — F43.29 ADJUSTMENT DISORDER WITH MIXED EMOTIONAL FEATURES: Primary | ICD-10-CM

## 2021-09-14 PROCEDURE — 90837 PSYTX W PT 60 MINUTES: CPT | Mod: 95 | Performed by: MARRIAGE & FAMILY THERAPIST

## 2021-09-14 PROCEDURE — 99207 PR NO BILLABLE SERVICE THIS VISIT: CPT | Performed by: MARRIAGE & FAMILY THERAPIST

## 2021-09-15 NOTE — PROGRESS NOTES
[place] -  Case Progress Note    Date of Service: 21   Name: Herminia Garcia  : 1955  Medical Record Number: [id number]  Treating Provider: Diane Menendez, PhD, [facility]  Type of Session: Individual  Present in Session: Herminia  Number of Minutes:  55   tx plan revised and updated below - 21    Video start time: 6:08  Video end time: 7:02        Telemedicine Visit: The patient's condition can be safely assessed and treated via synchronous audio and visual telemedicine encounter.      Reason for Telemedicine Visit: Services only offered telehealth    Originating Site (Patient Location): Patient's home    Distant Site (Provider Location): Provider Remote Setting    Consent:  The patient/guardian has verbally consented to: the potential risks and benefits of telemedicine (video visit) versus in person care; bill my insurance or make self-payment for services provided; and responsibility for payment of non-covered services.     Mode of Communication:  Video Conference via doxy    As the provider I attest to compliance with applicable laws and regulations related to telemedicine.      Current Symptoms/Status:  Sadness, anxiety, in reaction to stress or changes    Progress Toward Treatment Goals:   Making progress towards goals.    Intervention: Modality and Description:  SFT and supportive therapy. Herminia discussed a pattern of feeling not present or important without others - in particular as it relates to being in a big family. We discussed grounding techniques to use when attempting to feel more present, without being overwhelmed by trauma like memories or reactions to her surroundings. In particular, remembering her present state over her past state. Herminia also processed work related stress and managing interpersonal dynamics. We explored what can help her hold onto herself while at work, including the work itself.    Response to Intervention:  Open and  cooperative    Assignment:  None at this time.        DSM-5 Diagnoses:  Diagnoses       Codes Comments    Adjustment disorder with mixed emotional features    -  Primary F43.29           Plan/Need for Future Services:  Return for therapy in 2 weeks to treat diagnosed problems.        Diane Menendez, PhD, [facility]    TREATMENT PLAN    Date of Treatment Plan 21 - reviewed; remain same goals - remove one problem (panic when stressed) as resolved  Name: Herminia Garcia  : 1955  Medical Record Number: [id number]  Treating Provider: Diane Menenedz, PhD, [facility]        Current Status:    Depression/Mood:  Sad  Decreased Self-Esteem    Anxiety/Panic:  Worry  Psysiological Reactivity    Thought:   Reports no problems or symptoms at this time    Sensorium:  Reports no problems or symptoms at this time    Behavior/Health:  Reports no problems or symptoms at this time    Chemical Use:  Denies both Alcohol and Drug Abuse    Sexual Problems:  Reports no problems or symptoms at this time    Suicide Risk Assessment:  Assessed Level of Immediate Risk:  NO  Ideation:  NO  Plan:  NO  Means:  NOT APPLICABLE  Intent:  NO    Homicide Risk Assessment:  Assessed Level of Immediate Risk:  NOT REVIEWED  Ideation:  NOT REVIEWED  Plan:  NOT REVIEWED  Means:  NOT REVIEWED  Intent:  NOT REVIEWED    Impact of Symptoms on Function:  Decreased Social/Family Function    DSM-5 Diagnoses:   Diagnoses       Codes Comments    Adjustment disorder with mixed emotional features    -  Primary F43.29           Problem(s):  1. stress  2. Self-confidence      Short-Long Term Goals  Decrease Symptoms  Increase Coping/management of stress and emotions  Increase Decision Making    Interventions  [facility] [place] [facility]    Expected Outcomes and Prognosis  Expected improvement    Anticipated Treatment Duration:  1 year    Frequency of Sessions  2 x / Month    Progress Update (for Plan Update Only)  Compliant, Progressing and Improving - Needs More  Sessions      Discharge & Aftercare Goals: To Be Determined.    Care Coordination: No    Consent to Treatment:     Patient participated in this treatment planning process and indicated verbal agreement with the above treatment plan.    Patient Signature: virtually agreed   Date: 8/17/2021      Provider Signature: Diane Menendez, PhD, LMFT  Date: 8/17/2021

## 2021-10-13 ENCOUNTER — VIRTUAL VISIT (OUTPATIENT)
Dept: PSYCHOLOGY | Facility: CLINIC | Age: 66
End: 2021-10-13
Payer: COMMERCIAL

## 2021-10-13 DIAGNOSIS — F43.29 ADJUSTMENT DISORDER WITH MIXED EMOTIONAL FEATURES: Primary | ICD-10-CM

## 2021-10-13 PROCEDURE — 99207 PR NO BILLABLE SERVICE THIS VISIT: CPT | Performed by: MARRIAGE & FAMILY THERAPIST

## 2021-10-13 PROCEDURE — 90837 PSYTX W PT 60 MINUTES: CPT | Mod: 95 | Performed by: MARRIAGE & FAMILY THERAPIST

## 2021-10-15 NOTE — PROGRESS NOTES
[facility] -  Case Progress Note    Date of Service: 10/13/21   Name: Herminia Garcia  : 1955  Medical Record Number: [id number]  Treating Provider: Diane Menendez, PhD, [facility]  Type of Session: Individual  Present in Session: Herminia  Number of Minutes:  55   tx plan revised and updated below - 21    Video start time: 4:00  Video end time: 4:55        Telemedicine Visit: The patient's condition can be safely assessed and treated via synchronous audio and visual telemedicine encounter.      Reason for Telemedicine Visit: Services only offered telehealth    Originating Site (Patient Location): Patient's home    Distant Site (Provider Location): Provider Remote Setting    Consent:  The patient/guardian has verbally consented to: the potential risks and benefits of telemedicine (video visit) versus in person care; bill my insurance or make self-payment for services provided; and responsibility for payment of non-covered services.     Mode of Communication:  Video Conference via doxy    As the provider I attest to compliance with applicable laws and regulations related to telemedicine.      Current Symptoms/Status:  Sadness, anxiety, in reaction to stress or changes    Progress Toward Treatment Goals:   Making progress towards goals.    Intervention: Modality and Description:  SFT and supportive therapy. Herminia processed workplace stressors, including her direct supervisor exhibiting verbally abusive behavior that had a large impact on the office. Following an outburst, her supervisor reported that he is struggling and therefore will be retiring in 4 months. Herminia described finding support balanced with self-validation --- she was encouraged to continue using both strategies for managing changes and stressors.    Response to Intervention:  Open and cooperative    Assignment:  None at this time.        DSM-5 Diagnoses:  Diagnoses       Codes Comments    Adjustment disorder with mixed emotional  features    -  Primary F43.29           Plan/Need for Future Services:  Return for therapy in 2 weeks to treat diagnosed problems.        Diane Menendez, PhD, [facility]    TREATMENT PLAN    Date of Treatment Plan 21 - reviewed; remain same goals - remove one problem (panic when stressed) as resolved  Name: Herminia Garcia  : 1955  Medical Record Number: [id number]  Treating Provider: Diane Menendez, PhD, [facility]        Current Status:    Depression/Mood:  Sad  Decreased Self-Esteem    Anxiety/Panic:  Worry  Psysiological Reactivity    Thought:   Reports no problems or symptoms at this time    Sensorium:  Reports no problems or symptoms at this time    Behavior/Health:  Reports no problems or symptoms at this time    Chemical Use:  Denies both Alcohol and Drug Abuse    Sexual Problems:  Reports no problems or symptoms at this time    Suicide Risk Assessment:  Assessed Level of Immediate Risk:  NO  Ideation:  NO  Plan:  NO  Means:  NOT APPLICABLE  Intent:  NO    Homicide Risk Assessment:  Assessed Level of Immediate Risk:  NOT REVIEWED  Ideation:  NOT REVIEWED  Plan:  NOT REVIEWED  Means:  NOT REVIEWED  Intent:  NOT REVIEWED    Impact of Symptoms on Function:  Decreased Social/Family Function    DSM-5 Diagnoses:   Diagnoses       Codes Comments    Adjustment disorder with mixed emotional features    -  Primary F43.29           Problem(s):  1. stress  2. Self-confidence      Short-Long Term Goals  Decrease Symptoms  Increase Coping/management of stress and emotions  Increase Decision Making    Interventions  [facility] [facility]    Expected Outcomes and Prognosis  Expected improvement    Anticipated Treatment Duration:  1 year    Frequency of Sessions  2 x / Month    Progress Update (for Plan Update Only)  Compliant, Progressing and Improving - Needs More Sessions      Discharge & Aftercare Goals: To Be Determined.    Care Coordination: No    Consent to Treatment:     Patient participated in this treatment  planning process and indicated verbal agreement with the above treatment plan.    Patient Signature: virtually agreed   Date: 8/17/2021      Provider Signature: Diane Menendez, PhD, LMFT  Date: 8/17/2021

## 2021-10-23 ENCOUNTER — HEALTH MAINTENANCE LETTER (OUTPATIENT)
Age: 66
End: 2021-10-23

## 2021-11-03 ENCOUNTER — VIRTUAL VISIT (OUTPATIENT)
Dept: PSYCHOLOGY | Facility: CLINIC | Age: 66
End: 2021-11-03
Payer: COMMERCIAL

## 2021-11-03 DIAGNOSIS — F43.29 ADJUSTMENT DISORDER WITH MIXED EMOTIONAL FEATURES: Primary | ICD-10-CM

## 2021-11-03 PROCEDURE — 99207 PR NO BILLABLE SERVICE THIS VISIT: CPT | Performed by: MARRIAGE & FAMILY THERAPIST

## 2021-11-03 PROCEDURE — 90837 PSYTX W PT 60 MINUTES: CPT | Mod: 95 | Performed by: MARRIAGE & FAMILY THERAPIST

## 2021-11-07 NOTE — PROGRESS NOTES
[place] [facility] -  Case Progress Note    Date of Service: 21   Name: Herminia Garcia  : 1955  Medical Record Number: [id number]  Treating Provider: Diane Menendez, PhD, [facility]  Type of Session: Individual  Present in Session: Herminia  Number of Minutes:  56   tx plan revised and updated below - 21    Video start time: 4:01  Video end time: 4:57        Telemedicine Visit: The patient's condition can be safely assessed and treated via synchronous audio and visual telemedicine encounter.      Reason for Telemedicine Visit: Services only offered telehealth    Originating Site (Patient Location): Patient's home    Distant Site (Provider Location): Provider Remote Setting    Consent:  The patient/guardian has verbally consented to: the potential risks and benefits of telemedicine (video visit) versus in person care; bill my insurance or make self-payment for services provided; and responsibility for payment of non-covered services.     Mode of Communication:  Video Conference via doxy    As the provider I attest to compliance with applicable laws and regulations related to telemedicine.      Current Symptoms/Status:  Sadness, anxiety, in reaction to stress or changes    Progress Toward Treatment Goals:   Making progress towards goals.    Intervention: Modality and Description:  SFT and supportive therapy. Herminia processed work stress, covid dx, relying on self instead of community, with a small number of social supports. In particular, she was diagnosed with covid and quarantined, but did not have any symptoms.     Response to Intervention:  Open and cooperative    Assignment:  None at this time.        DSM-5 Diagnoses:  Diagnoses       Codes Comments    Adjustment disorder with mixed emotional features    -  Primary F43.29           Plan/Need for Future Services:  Return for therapy in 2 weeks to treat diagnosed problems.        Diane Menendez, PhD, [facility]    TREATMENT PLAN    Date of  Treatment Plan 21 - reviewed; remain same goals - remove one problem (panic when stressed) as resolved  Name: Herminia Garcia  : 1955  Medical Record Number: [id number]  Treating Provider: Diane Menendez, PhD, [facility]        Current Status:    Depression/Mood:  Sad  Decreased Self-Esteem    Anxiety/Panic:  Worry  Psysiological Reactivity    Thought:   Reports no problems or symptoms at this time    Sensorium:  Reports no problems or symptoms at this time    Behavior/Health:  Reports no problems or symptoms at this time    Chemical Use:  Denies both Alcohol and Drug Abuse    Sexual Problems:  Reports no problems or symptoms at this time    Suicide Risk Assessment:  Assessed Level of Immediate Risk:  NO  Ideation:  NO  Plan:  NO  Means:  NOT APPLICABLE  Intent:  NO    Homicide Risk Assessment:  Assessed Level of Immediate Risk:  NOT REVIEWED  Ideation:  NOT REVIEWED  Plan:  NOT REVIEWED  Means:  NOT REVIEWED  Intent:  NOT REVIEWED    Impact of Symptoms on Function:  Decreased Social/Family Function    DSM-5 Diagnoses:   Diagnoses       Codes Comments    Adjustment disorder with mixed emotional features    -  Primary F43.29           Problem(s):  1. stress  2. Self-confidence      Short-Long Term Goals  Decrease Symptoms  Increase Coping/management of stress and emotions  Increase Decision Making    Interventions  [facility]    Expected Outcomes and Prognosis  Expected improvement    Anticipated Treatment Duration:  1 year    Frequency of Sessions  2 x / Month    Progress Update (for Plan Update Only)  Compliant, Progressing and Improving - Needs More Sessions      Discharge & Aftercare Goals: To Be Determined.    Care Coordination: No    Consent to Treatment:     Patient participated in this treatment planning process and indicated verbal agreement with the above treatment plan.    Patient Signature: virtually agreed   Date: 2021      Provider Signature: Diane Menendez, PhD,  LMFT  Date: 8/17/2021

## 2021-11-16 ENCOUNTER — VIRTUAL VISIT (OUTPATIENT)
Dept: PSYCHOLOGY | Facility: CLINIC | Age: 66
End: 2021-11-16
Payer: COMMERCIAL

## 2021-11-16 DIAGNOSIS — F43.29 ADJUSTMENT DISORDER WITH MIXED EMOTIONAL FEATURES: Primary | ICD-10-CM

## 2021-11-16 PROCEDURE — 90837 PSYTX W PT 60 MINUTES: CPT | Mod: 95 | Performed by: MARRIAGE & FAMILY THERAPIST

## 2021-11-16 PROCEDURE — 99207 PR NO BILLABLE SERVICE THIS VISIT: CPT | Performed by: MARRIAGE & FAMILY THERAPIST

## 2021-11-17 NOTE — PROGRESS NOTES
[place] [facility] -  Case Progress Note    Date of Service: 21   Name: Herminia Garcia  : 1955  Medical Record Number: [id number]  Treating Provider: Diane Menendez, PhD, [facility]  Type of Session: Individual  Present in Session: Herminia  Number of Minutes:  56   tx plan revised and updated below - 21    Video start time: 4:01  Video end time: 4:57        Telemedicine Visit: The patient's condition can be safely assessed and treated via synchronous audio and visual telemedicine encounter.      Reason for Telemedicine Visit: Services only offered telehealth    Originating Site (Patient Location): Patient's home    Distant Site (Provider Location): Provider Remote Setting    Consent:  The patient/guardian has verbally consented to: the potential risks and benefits of telemedicine (video visit) versus in person care; bill my insurance or make self-payment for services provided; and responsibility for payment of non-covered services.     Mode of Communication:  Video Conference via doxy    As the provider I attest to compliance with applicable laws and regulations related to telemedicine.      Current Symptoms/Status:  Sadness, anxiety, in reaction to stress or changes    Progress Toward Treatment Goals:   Making progress towards goals.    Intervention: Modality and Description:  SFT and supportive therapy. Herminia processed feelings of being out of community, feeling not understood due to food, vaccine, and marital separation. She also shared that she is having to assert herself more than usual in many places and that can leave her fatigued. She has begun to wonder who she will be in prison, thinking through being authentic self in various spaces.     Response to Intervention:  Open and cooperative    Assignment:  None at this time.        DSM-5 Diagnoses:  Diagnoses       Codes Comments    Adjustment disorder with mixed emotional features    -  Primary F43.29           Plan/Need for Future  Services:  Return for therapy in 2 weeks to treat diagnosed problems.        Diane Menendez, PhD, [facility]    TREATMENT PLAN    Date of Treatment Plan 21 - reviewed; remain same goals - remove one problem (panic when stressed) as resolved  Name: Herminia Garcia  : 1955  Medical Record Number: [id number]  Treating Provider: Diane Menendez, PhD, [facility]        Current Status:    Depression/Mood:  Sad  Decreased Self-Esteem    Anxiety/Panic:  Worry  Psysiological Reactivity    Thought:   Reports no problems or symptoms at this time    Sensorium:  Reports no problems or symptoms at this time    Behavior/Health:  Reports no problems or symptoms at this time    Chemical Use:  Denies both Alcohol and Drug Abuse    Sexual Problems:  Reports no problems or symptoms at this time    Suicide Risk Assessment:  Assessed Level of Immediate Risk:  NO  Ideation:  NO  Plan:  NO  Means:  NOT APPLICABLE  Intent:  NO    Homicide Risk Assessment:  Assessed Level of Immediate Risk:  NOT REVIEWED  Ideation:  NOT REVIEWED  Plan:  NOT REVIEWED  Means:  NOT REVIEWED  Intent:  NOT REVIEWED    Impact of Symptoms on Function:  Decreased Social/Family Function    DSM-5 Diagnoses:   Diagnoses       Codes Comments    Adjustment disorder with mixed emotional features    -  Primary F43.29           Problem(s):  1. stress  2. Self-confidence      Short-Long Term Goals  Decrease Symptoms  Increase Coping/management of stress and emotions  Increase Decision Making    Interventions  [facility]    Expected Outcomes and Prognosis  Expected improvement    Anticipated Treatment Duration:  1 year    Frequency of Sessions  2 x / Month    Progress Update (for Plan Update Only)  Compliant, Progressing and Improving - Needs More Sessions      Discharge & Aftercare Goals: To Be Determined.    Care Coordination: No    Consent to Treatment:     Patient participated in this treatment planning process and indicated verbal agreement with the  above treatment plan.    Patient Signature: virtually agreed   Date: 8/17/2021      Provider Signature: Diane Menendez, PhD, LMFT  Date: 8/17/2021

## 2021-12-08 ENCOUNTER — VIRTUAL VISIT (OUTPATIENT)
Dept: PSYCHOLOGY | Facility: CLINIC | Age: 66
End: 2021-12-08
Payer: COMMERCIAL

## 2021-12-08 DIAGNOSIS — F43.29 ADJUSTMENT DISORDER WITH MIXED EMOTIONAL FEATURES: Primary | ICD-10-CM

## 2021-12-08 PROCEDURE — 90837 PSYTX W PT 60 MINUTES: CPT | Mod: 95 | Performed by: MARRIAGE & FAMILY THERAPIST

## 2021-12-08 PROCEDURE — 99207 PR NO BILLABLE SERVICE THIS VISIT: CPT | Performed by: MARRIAGE & FAMILY THERAPIST

## 2021-12-13 NOTE — PROGRESS NOTES
[place] [facility] -  Case Progress Note    Date of Service: 21   Name: Herminia Garcia  : 1955  Medical Record Number: [id number]  Treating Provider: Diane Menendez, PhD, [facility]  Type of Session: Individual  Present in Session: Hemrinia  Number of Minutes:  56   tx plan revised and updated below - 21    Video start time: 4:01  Video end time: 4:57        Telemedicine Visit: The patient's condition can be safely assessed and treated via synchronous audio and visual telemedicine encounter.      Reason for Telemedicine Visit: Services only offered telehealth    Originating Site (Patient Location): Patient's home    Distant Site (Provider Location): Provider Remote Setting    Consent:  The patient/guardian has verbally consented to: the potential risks and benefits of telemedicine (video visit) versus in person care; bill my insurance or make self-payment for services provided; and responsibility for payment of non-covered services.     Mode of Communication:  Video Conference via doxy    As the provider I attest to compliance with applicable laws and regulations related to telemedicine.      Current Symptoms/Status:  Sadness, anxiety, in reaction to stress or changes    Progress Toward Treatment Goals:   Making progress towards goals.    Intervention: Modality and Description:  SFT and supportive therapy. Herminia noted improvements. She is feeling calmer - we processed what is contributing to this improvement. She noted gaining great satisfaction from adult relationships with kids, and nutrition support. Herminia is looking towards [employer], possibly in 2023 and is thinking about what this could look like for her, in particular as a single woman.    Response to Intervention:  Open and cooperative    Assignment:  None at this time.        DSM-5 Diagnoses:  Diagnoses       Codes Comments    Adjustment disorder with mixed emotional features    -  Primary F43.29           Plan/Need for Future  Services:  Return for therapy in 2 weeks to treat diagnosed problems.        Diane Menendez, PhD, [facility]    TREATMENT PLAN    Date of Treatment Plan 21 - reviewed; remain same goals - remove one problem (panic when stressed) as resolved  Name: Herminia Garcia  : 1955  Medical Record Number: [id number]  Treating Provider: Diane Menendez, PhD, [facility]        Current Status:    Depression/Mood:  Sad  Decreased Self-Esteem    Anxiety/Panic:  Worry  Psysiological Reactivity    Thought:   Reports no problems or symptoms at this time    Sensorium:  Reports no problems or symptoms at this time    Behavior/Health:  Reports no problems or symptoms at this time    Chemical Use:  Denies both Alcohol and Drug Abuse    Sexual Problems:  Reports no problems or symptoms at this time    Suicide Risk Assessment:  Assessed Level of Immediate Risk:  NO  Ideation:  NO  Plan:  NO  Means:  NOT APPLICABLE  Intent:  NO    Homicide Risk Assessment:  Assessed Level of Immediate Risk:  NOT REVIEWED  Ideation:  NOT REVIEWED  Plan:  NOT REVIEWED  Means:  NOT REVIEWED  Intent:  NOT REVIEWED    Impact of Symptoms on Function:  Decreased Social/Family Function    DSM-5 Diagnoses:   Diagnoses       Codes Comments    Adjustment disorder with mixed emotional features    -  Primary F43.29           Problem(s):  1. stress  2. Self-confidence      Short-Long Term Goals  Decrease Symptoms  Increase Coping/management of stress and emotions  Increase Decision Making    Interventions  [facility]    Expected Outcomes and Prognosis  Expected improvement    Anticipated Treatment Duration:  1 year    Frequency of Sessions  2 x / Month    Progress Update (for Plan Update Only)  Compliant, Progressing and Improving - Needs More Sessions      Discharge & Aftercare Goals: To Be Determined.    Care Coordination: No    Consent to Treatment:     Patient participated in this treatment planning process and indicated verbal agreement with the  above treatment plan.    Patient Signature: virtually agreed   Date: 8/17/2021      Provider Signature: Diane Menendez, PhD, LMFT  Date: 8/17/2021

## 2022-01-18 ENCOUNTER — VIRTUAL VISIT (OUTPATIENT)
Dept: PSYCHOLOGY | Facility: CLINIC | Age: 67
End: 2022-01-18
Payer: COMMERCIAL

## 2022-01-18 DIAGNOSIS — F43.29 ADJUSTMENT DISORDER WITH MIXED EMOTIONAL FEATURES: Primary | ICD-10-CM

## 2022-01-18 PROCEDURE — 90837 PSYTX W PT 60 MINUTES: CPT | Mod: 95 | Performed by: MARRIAGE & FAMILY THERAPIST

## 2022-01-18 PROCEDURE — 99207 PR NO BILLABLE SERVICE THIS VISIT: CPT | Performed by: MARRIAGE & FAMILY THERAPIST

## 2022-01-18 NOTE — PROGRESS NOTES
[place] [facility] - Progress Note    Date of Service: 22   Name: Herminia Garcia  : 1955  Medical Record Number: [id number]  Treating Provider: ELIJAH Velez  Type of Session: Individual  Present in Session: Herminia  Session Start and Stop Time: 4:00-4:55  Number of Minutes:  55    SERVICE MODALITY:  Video Visit:      Provider verified identity through the following two step process.  Patient provided:  Patient is known previously to provider    Telemedicine Visit: The patient's condition can be safely assessed and treated via synchronous audio and visual telemedicine encounter.      Reason for Telemedicine Visit: Services only offered telehealth    Originating Site (Patient Location): Patient's home    Distant Site (Provider Location): Provider Remote Setting- Home Office    Consent:  The patient/guardian has verbally consented to: the potential risks and benefits of telemedicine (video visit) versus in person care; bill my insurance or make self-payment for services provided; and responsibility for payment of non-covered services.     Patient would like the video invitation sent by:  Send to e-mail at: [email or web address]    Mode of Communication:  Video Conference via [email or web address]    As the provider I attest to compliance with applicable laws and regulations related to telemedicine.    DSM-5 Diagnoses:  Encounter Diagnosis   Name Primary?     Adjustment disorder with mixed emotional features Yes       Current Reported Symptoms and Status update:  Sadness, anxiety, in reaction to stress or changes       Progress Toward Treatment Goals:   Satisfactory progress - ACTION (Actively working towards change); Intervened by reinforcing change plan / affirming steps taken    Therapeutic Interventions/Treatment Strategies:    Area(s) of treatment focus addressed in this session included Symptom Management and Develop Socialization / Interpersonal Relationship Skills.    Updates:  Began a Romantic relationship in december, got divorce forms to pursue divorce. She expressed nervousness about future - fear of loss. She asked several questions regarding reality, meditation - wondering about moving forward but not wanting to deny herself happiness.    Psychotherapist offered support, feedback and validation.  Support    Patient Response:   Patient responded to session by accepting feedback  Possible barriers to participation / learning include: and no barriers identified    Current Mental Status Exam:   Appearance:  Appropriate   Eye Contact:  Good   Attitude / Demeanor: Cooperative   Speech      Rate / Production: Normal/ Responsive      Volume:  Normal  volume  Orientation:  All  Mood:   Normal  Affect:   Appropriate   Thought Content: Clear   Insight:   Good       Plan/Need for Future Services:  Return for therapy in 4 weeks to treat diagnosed problems.    Patient has a current master individualized treatment plan.  See Epic treatment plan for more information.    Assignment:  Reflect on reasons for continued focus on reality - what is real    Interactive Complexity:  There are four specific communication difficulties that complicate the work of the primary psychiatric procedure.  Interactive complexity (+[id number]) may be reported when at least one of these difficulties is present.    Communication difficulties present during current the psychiatric procedure include:  1. None.      Signature/Title:    Diane Menendez, PhD, LMFT

## 2022-02-08 ENCOUNTER — VIRTUAL VISIT (OUTPATIENT)
Dept: PSYCHOLOGY | Facility: CLINIC | Age: 67
End: 2022-02-08
Payer: COMMERCIAL

## 2022-02-08 DIAGNOSIS — F43.29 ADJUSTMENT DISORDER WITH MIXED EMOTIONAL FEATURES: Primary | ICD-10-CM

## 2022-02-08 PROCEDURE — 90837 PSYTX W PT 60 MINUTES: CPT | Mod: 95 | Performed by: MARRIAGE & FAMILY THERAPIST

## 2022-02-08 PROCEDURE — 99207 PR NO BILLABLE SERVICE THIS VISIT: CPT | Performed by: MARRIAGE & FAMILY THERAPIST

## 2022-02-09 NOTE — PROGRESS NOTES
[place] [facility] - Progress Note    Date of Service: 22   Name: Herminia Garcia  : 1955  Medical Record Number: [id number]  Treating Provider: ELIJAH Velez  Type of Session: Individual  Present in Session: Herminia  Session Start and Stop Time: 3:00 - 3:57  Number of Minutes:  57    SERVICE MODALITY:  Video Visit:      Provider verified identity through the following two step process.  Patient provided:  Patient is known previously to provider    Telemedicine Visit: The patient's condition can be safely assessed and treated via synchronous audio and visual telemedicine encounter.      Reason for Telemedicine Visit: Services only offered telehealth    Originating Site (Patient Location): Patient's home    Distant Site (Provider Location): [facility]    Consent:  The patient/guardian has verbally consented to: the potential risks and benefits of telemedicine (video visit) versus in person care; bill my insurance or make self-payment for services provided; and responsibility for payment of non-covered services.     Patient would like the video invitation sent by:  Send to e-mail at: [email or web address]    Mode of Communication:  Video Conference via [email or web address]    As the provider I attest to compliance with applicable laws and regulations related to telemedicine.    DSM-5 Diagnoses:  Encounter Diagnosis   Name Primary?     Adjustment disorder with mixed emotional features Yes       Current Reported Symptoms and Status update:  Sadness, anxiety, in reaction to stress or changes       Progress Toward Treatment Goals:   Satisfactory progress    Therapeutic Interventions/Treatment Strategies:    Area(s) of treatment focus addressed in this session included Symptom Management.    Updates from last month - her friend , brother triple bypass surgery, [employer] offer, unsure when to divorce/retire; daughter distant; relationship going well but  introducing new fears. Identified strategies for managing one issue at a time, starting with talking to finance professionals and divorce  about the timing and divorce procedures.    Interventions include Emotions Management:  Increased awareness of daily mood patterns/changes.  Support    Patient Response:   Patient responded to session by accepting feedback  Possible barriers to participation / learning include: and no barriers identified    Current Mental Status Exam:   Appearance:  Appropriate   Eye Contact:  Good   Attitude / Demeanor: Cooperative   Speech      Rate / Production: Normal/ Responsive      Volume:  Normal  volume  Orientation:  All  Mood:   Sad   Affect:   Appropriate   Thought Content: Clear   Insight:   Good       Plan/Need for Future Services:  Return for therapy in 2-4 weeks to treat diagnosed problems.    Patient has a current master individualized treatment plan.  See Epic treatment plan for more information.    Assignment:  Use coping skills        Signature/Title:    Diane Menendez, PhD, LMFT

## 2022-02-12 ENCOUNTER — HEALTH MAINTENANCE LETTER (OUTPATIENT)
Age: 67
End: 2022-02-12

## 2022-03-08 ENCOUNTER — VIRTUAL VISIT (OUTPATIENT)
Dept: PSYCHOLOGY | Facility: CLINIC | Age: 67
End: 2022-03-08
Payer: COMMERCIAL

## 2022-03-08 DIAGNOSIS — F43.29 ADJUSTMENT DISORDER WITH MIXED EMOTIONAL FEATURES: Primary | ICD-10-CM

## 2022-03-08 PROCEDURE — 90837 PSYTX W PT 60 MINUTES: CPT | Mod: 95 | Performed by: MARRIAGE & FAMILY THERAPIST

## 2022-03-08 PROCEDURE — 99207 PR NO BILLABLE SERVICE THIS VISIT: CPT | Performed by: MARRIAGE & FAMILY THERAPIST

## 2022-03-09 NOTE — PROGRESS NOTES
[place] [facility] - Progress Note    Date of Service: 3/08/22   Name: Herminia Garcia  : 1955  Medical Record Number: [id number]  Treating Provider: ELIJAH Velez  Type of Session: Individual  Present in Session: Herminia  Session Start and Stop Time: 2:01 - 2:57  Number of Minutes:  56    SERVICE MODALITY:  Video Visit:      Provider verified identity through the following two step process.  Patient provided:  Patient is known previously to provider    Telemedicine Visit: The patient's condition can be safely assessed and treated via synchronous audio and visual telemedicine encounter.      Reason for Telemedicine Visit: Services only offered telehealth    Originating Site (Patient Location): Patient's home    Distant Site (Provider Location): [facility]    Consent:  The patient/guardian has verbally consented to: the potential risks and benefits of telemedicine (video visit) versus in person care; bill my insurance or make self-payment for services provided; and responsibility for payment of non-covered services.     Patient would like the video invitation sent by:  Send to e-mail at: [email or web address]    Mode of Communication:  Video Conference via [email or web address]    As the provider I attest to compliance with applicable laws and regulations related to telemedicine.    DSM-5 Diagnoses:  Encounter Diagnosis   Name Primary?     Adjustment disorder with mixed emotional features Yes       Current Reported Symptoms and Status update:  Sadness, anxiety, in reaction to stress or changes       Progress Toward Treatment Goals:   Satisfactory Progress    Therapeutic Interventions/Treatment Strategies:    Area(s) of treatment focus addressed in this session included Symptom Management.    Client processed reframing stress; making decisions and finding confidence; opening up to happiness. Noted increased moments of happiness that appear related to new  relationship and using coping skills to reduce stress.    Psychotherapist offered support, feedback and validation.      Patient Response:   Patient responded to session by accepting feedback and focusing on goals  Possible barriers to participation / learning include: and no barriers identified    Current Mental Status Exam:   Appearance:  Appropriate   Eye Contact:  Good   Attitude / Demeanor: Cooperative   Speech      Rate / Production: Normal/ Responsive      Volume:  Normal  volume  Orientation:  All  Mood:   Normal  Affect:   Appropriate   Thought Content: Clear   Insight:   Good       Plan/Need for Future Services:  Return for therapy in 2-4 weeks to treat diagnosed problems.    Patient has a current master individualized treatment plan.  See Epic treatment plan for more information.    Assignment:  Continue using coping skills          Signature/Title:    Diane Menendez, PhD, LMFT

## 2022-04-06 ENCOUNTER — VIRTUAL VISIT (OUTPATIENT)
Dept: PSYCHOLOGY | Facility: CLINIC | Age: 67
End: 2022-04-06
Payer: COMMERCIAL

## 2022-04-06 DIAGNOSIS — F43.29 ADJUSTMENT DISORDER WITH MIXED EMOTIONAL FEATURES: Primary | ICD-10-CM

## 2022-04-06 PROCEDURE — 90834 PSYTX W PT 45 MINUTES: CPT | Mod: 95 | Performed by: MARRIAGE & FAMILY THERAPIST

## 2022-04-06 PROCEDURE — 99207 PR NO BILLABLE SERVICE THIS VISIT: CPT | Performed by: MARRIAGE & FAMILY THERAPIST

## 2022-04-11 NOTE — PROGRESS NOTES
[place] [facility] - Progress Note    Date of Service: 22   Name: Herminia Garcia  : 1955  Medical Record Number: [id number]  Treating Provider: ELIJAH Velez  Type of Session: Individual  Present in Session: Herminia  Session Start and Stop Time: 4:10-5:00  Number of Minutes:  50 (late start due to tech issues)    SERVICE MODALITY:  Video Visit:      Provider verified identity through the following two step process.  Patient provided:  Patient is known previously to provider    Telemedicine Visit: The patient's condition can be safely assessed and treated via synchronous audio and visual telemedicine encounter.      Reason for Telemedicine Visit: Patient has requested telehealth visit    Originating Site (Patient Location): Patient's home    Distant Site (Provider Location): [facility]    Consent:  The patient/guardian has verbally consented to: the potential risks and benefits of telemedicine (video visit) versus in person care; bill my insurance or make self-payment for services provided; and responsibility for payment of non-covered services.     Patient would like the video invitation sent by:  Send to e-mail at: [email or web address]    Mode of Communication:  Video Conference via [email or web address]    As the provider I attest to compliance with applicable laws and regulations related to telemedicine.    DSM-5 Diagnoses:  Encounter Diagnosis   Name Primary?     Adjustment disorder with mixed emotional features Yes       Current Reported Symptoms and Status update:  Sadness, anxiety, in reaction to stress or changes       Progress Toward Treatment Goals:   Satisfactory progress    Therapeutic Interventions/Treatment Strategies:    Herminia discussed the continued amount of changes that contribute to ongoing sadness and stress, focusing on work, possible divorce papers, and new relationship. Concerned about moving in with partner or possibly  selling her house. Introduced children to new partner and we discussed realistic expectations.     Psychotherapist offered support, feedback and validation.      Patient Response:   Patient responded to session by being attentive  Possible barriers to participation / learning include: and no barriers identified    Current Mental Status Exam:   Appearance:  Appropriate   Eye Contact:  Good   Attitude / Demeanor: Cooperative   Speech      Rate / Production: Normal/ Responsive      Volume:  Normal  volume  Orientation:  All  Mood:   Normal  Affect:   Appropriate   Thought Content: Clear   Insight:   Good       Plan/Need for Future Services:  Return for therapy in 2-4 weeks to treat diagnosed problems.    Patient has a current master individualized treatment plan.  See Epic treatment plan for more information.    Assignment:  Take one situation at a time; reflect on realistic expectations regarding blended family situation.        Signature/Title:    Diane Menendez, PhD, LMFT

## 2022-06-15 ENCOUNTER — VIRTUAL VISIT (OUTPATIENT)
Dept: PSYCHOLOGY | Facility: CLINIC | Age: 67
End: 2022-06-15
Payer: COMMERCIAL

## 2022-06-15 DIAGNOSIS — F43.29 ADJUSTMENT DISORDER WITH MIXED EMOTIONAL FEATURES: Primary | ICD-10-CM

## 2022-06-15 PROCEDURE — 90837 PSYTX W PT 60 MINUTES: CPT | Mod: 95 | Performed by: MARRIAGE & FAMILY THERAPIST

## 2022-06-15 PROCEDURE — 99207 PR NO BILLABLE SERVICE THIS VISIT: CPT | Performed by: MARRIAGE & FAMILY THERAPIST

## 2022-06-15 NOTE — PROGRESS NOTES
[place] [facility] - Progress Note    Date of Service: 6/15/22   Name: Herminia Garcia  : 1955  Medical Record Number: [id number]  Treating Provider: ELIJAH Velez  Type of Session: Individual  Present in Session: Herminia  Session Start and Stop Time: 3:00 - 3:56  Number of Minutes:  56    SERVICE MODALITY:  Video Visit:      Provider verified identity through the following two step process.  Patient provided:  Patient  and Patient address    Telemedicine Visit: The patient's condition can be safely assessed and treated via synchronous audio and visual telemedicine encounter.      Reason for Telemedicine Visit: Patient has requested telehealth visit    Originating Site (Patient Location): Patient's home    Distant Site (Provider Location): [facility]    Consent:  The patient/guardian has verbally consented to: the potential risks and benefits of telemedicine (video visit) versus in person care; bill my insurance or make self-payment for services provided; and responsibility for payment of non-covered services.     Patient would like the video invitation sent by:  Send to e-mail at: [email or web address]    Mode of Communication:  Video Conference via [email or web address]    As the provider I attest to compliance with applicable laws and regulations related to telemedicine.    DSM-5 Diagnoses:  Encounter Diagnosis   Name Primary?     Adjustment disorder with mixed emotional features Yes       Current Reported Symptoms and Status update:  Sadness, anxiety, in reaction to stress or changes      Progress Toward Treatment Goals:   Satisfactory Progress    Therapeutic Interventions/Treatment Strategies:    Area(s) of treatment focus addressed in this session included Symptom Management.    Reported improvements in past 2 months - feeling grounded, moments of happiness, meaning making regarding experiences of last four years. Identified positive influence  of socializing, relationship, in finding herself again. Was able to get a loan with [employer] present and feels more peace. Some continued anxiety and trepidation about prison, divorce, but less overwhelmed. Discussed continuing with current goals monthly with working towards ending therapy in the future.    Psychotherapist offered support, feedback and validation.  Problem Solving; SFT    Patient Response:   Patient responded to session by accepting feedback and focusing on goals  Possible barriers to participation / learning include: and no barriers identified    Current Mental Status Exam:   Appearance:  Appropriate   Eye Contact:  Good   Attitude / Demeanor: Cooperative   Speech      Rate / Production: Normal/ Responsive      Volume:  Normal  volume  Orientation:  All  Mood:   Normal  Affect:   Appropriate   Thought Content: Clear   Insight:   Good       Plan/Need for Future Services:  Return for therapy in 4 weeks to treat diagnosed problems.    Patient has a current master individualized treatment plan.  See Epic treatment plan for more information.    Assignment:  Continue using strategies that are working          Signature/Title:    Diane Menendez, PhD, LMFT

## 2022-07-20 ENCOUNTER — VIRTUAL VISIT (OUTPATIENT)
Dept: PSYCHOLOGY | Facility: CLINIC | Age: 67
End: 2022-07-20
Payer: COMMERCIAL

## 2022-07-20 DIAGNOSIS — F43.29 ADJUSTMENT DISORDER WITH MIXED EMOTIONAL FEATURES: Primary | ICD-10-CM

## 2022-07-20 PROCEDURE — 99207 PR NO BILLABLE SERVICE THIS VISIT: CPT | Performed by: MARRIAGE & FAMILY THERAPIST

## 2022-07-20 PROCEDURE — 90837 PSYTX W PT 60 MINUTES: CPT | Mod: 95 | Performed by: MARRIAGE & FAMILY THERAPIST

## 2022-07-20 NOTE — PROGRESS NOTES
[facility] - Progress Note    Date of Service: 22   Name: Herminia Garcia  : 1955  Medical Record Number: [id number]  Treating Provider: ELIJAH Velez  Type of Session: Individual  Present in Session: Herminia  Session Start and Stop Time: 4:05 - 5:00  Number of Minutes:  55    SERVICE MODALITY:  Video Visit:      Provider verified identity through the following two step process.  Patient provided:  Patient is known previously to provider    Telemedicine Visit: The patient's condition can be safely assessed and treated via synchronous audio and visual telemedicine encounter.      Reason for Telemedicine Visit: Patient has requested telehealth visit    Originating Site (Patient Location): Patient's home    Distant Site (Provider Location): [facility]    Consent:  The patient/guardian has verbally consented to: the potential risks and benefits of telemedicine (video visit) versus in person care; bill my insurance or make self-payment for services provided; and responsibility for payment of non-covered services.     Patient would like the video invitation sent by:  Send to e-mail at: [email or web address]    Mode of Communication:  Video Conference via [email or web address]    As the provider I attest to compliance with applicable laws and regulations related to telemedicine.    DSM-5 Diagnoses:  Encounter Diagnosis   Name Primary?     Adjustment disorder with mixed emotional features Yes       Current Reported Symptoms and Status update:  Sadness, anxiety, in reaction to stress or changes        Progress Toward Treatment Goals:   Satisfactory progress    Therapeutic Interventions/Treatment Strategies:    Area(s) of treatment focus addressed in this session included Symptom Management.    Discussed challenging feelings of nothingness; results when alone - feels out of step with relationship. Would like to live with partner, but logistical issues  in the way. Define who her Nohemi is to her as a step to make more real, rather than deny their history - e.g., ex-spouse. Next month full of visits from her children and her partner's children.     Psychotherapist offered support, feedback and validation.      Patient Response:   Patient responded to session by focusing on goals  Possible barriers to participation / learning include: and no barriers identified    Current Mental Status Exam:   Appearance:  Appropriate   Eye Contact:  Good   Attitude / Demeanor: Cooperative   Speech      Rate / Production: Normal/ Responsive      Volume:  Normal  volume  Orientation:  All  Mood:   Normal  Affect:   Appropriate   Thought Content: Clear   Insight:   Good       Plan/Need for Future Services:  Return for therapy in 4 weeks to treat diagnosed problems.    Patient has a current master individualized treatment plan.  See Epic treatment plan for more information.    Assignment:  None at this time      Signature/Title:    Diane Menendez, PhD, LMFT

## 2022-08-17 ENCOUNTER — VIRTUAL VISIT (OUTPATIENT)
Dept: PSYCHOLOGY | Facility: CLINIC | Age: 67
End: 2022-08-17
Payer: COMMERCIAL

## 2022-08-17 DIAGNOSIS — F43.29 ADJUSTMENT DISORDER WITH MIXED EMOTIONAL FEATURES: Primary | ICD-10-CM

## 2022-08-17 PROCEDURE — 90837 PSYTX W PT 60 MINUTES: CPT | Mod: 95 | Performed by: MARRIAGE & FAMILY THERAPIST

## 2022-08-17 NOTE — PROGRESS NOTES
[facility] - Progress Note    Date of Service: 22   Name: Herminia Garcia  : 1955  Medical Record Number: [id number]  Treating Provider: ELIJAH Velez  Type of Session: Individual  Present in Session: Herminia  Session Start and Stop Time: 5:00-5:55  Number of Minutes:  55    SERVICE MODALITY:  Video Visit:      Provider verified identity through the following two step process.  Patient provided:  Patient is known previously to provider    Telemedicine Visit: The patient's condition can be safely assessed and treated via synchronous audio and visual telemedicine encounter.      Reason for Telemedicine Visit: Patient has requested telehealth visit    Originating Site (Patient Location): Patient's home    Distant Site (Provider Location): [facility]    Consent:  The patient/guardian has verbally consented to: the potential risks and benefits of telemedicine (video visit) versus in person care; bill my insurance or make self-payment for services provided; and responsibility for payment of non-covered services.     Patient would like the video invitation sent by:  Send to e-mail at: [email or web address]    Mode of Communication:  Video Conference via [email or web address]    As the provider I attest to compliance with applicable laws and regulations related to telemedicine.    DSM-5 Diagnoses:  Encounter Diagnosis   Name Primary?     Adjustment disorder with mixed emotional features Yes       Current Reported Symptoms and Status update:  Fluctuating but mild Sadness, anxiety, in reaction to stress or changes        Progress Toward Treatment Goals:   Has achieved goals    Therapeutic Interventions/Treatment Strategies:    Area(s) of treatment focus addressed in this session included Community Resources/Discharge Planning.    Decided that today would be the last session unless symptoms return. Herminia noted that she is ready to begin legal divorce  proceedings and asked to not officially close case in the case that symptoms return during that process. Overall her coping skills have improved, she is feeling grounded, has begun running. Some sadness about changes in relationship with daughter.    Treatment modalities used include SFT  Support and Review of progress    Patient Response:   Patient responded to session by being attentive  Possible barriers to participation / learning include: and no barriers identified    Current Mental Status Exam:   Appearance:  Appropriate   Eye Contact:  Good   Attitude / Demeanor: Cooperative   Speech      Rate / Production: Normal/ Responsive      Volume:  Normal  volume  Orientation:  All  Mood:   Normal  Affect:   Appropriate   Thought Content: Clear   Insight:   Good       Plan/Need for Future Services:  Return for therapy if symptoms return.    Patient has See Epic Treatment Plan - Patient is discharging.unless issues return. Discussed maintenance of progress.    Assignment:  Continue with what is working.          Signature/Title:    Diane Menendez, PhD, LMFT

## 2022-10-09 ENCOUNTER — HEALTH MAINTENANCE LETTER (OUTPATIENT)
Age: 67
End: 2022-10-09

## 2022-12-14 ENCOUNTER — VIRTUAL VISIT (OUTPATIENT)
Dept: PSYCHOLOGY | Facility: CLINIC | Age: 67
End: 2022-12-14
Payer: COMMERCIAL

## 2022-12-14 DIAGNOSIS — F43.29 ADJUSTMENT DISORDER WITH MIXED EMOTIONAL FEATURES: Primary | ICD-10-CM

## 2022-12-14 PROCEDURE — 90837 PSYTX W PT 60 MINUTES: CPT | Mod: 95 | Performed by: MARRIAGE & FAMILY THERAPIST

## 2022-12-14 NOTE — PROGRESS NOTES
[facility] - Progress Note    Date of Service: 22   Name: Herminia Garcia  : 1955  Medical Record Number: [id number]  Treating Provider: Diane Menendez, Ph.D, LMFT  Type of Session: Individual  Present in Session: Herminia  Session Start and Stop Time: 4:00-4:58  Number of Minutes:  58    Do updated DA and Tx plan next visit    SERVICE MODALITY:  Video Visit:      Provider verified identity through the following two step process.  Patient provided:  Patient is known previously to provider    Telemedicine Visit: The patient's condition can be safely assessed and treated via synchronous audio and visual telemedicine encounter.      Reason for Telemedicine Visit: Patient has requested telehealth visit    Originating Site (Patient Location): Patient's home    Distant Site (Provider Location): [facility]    Consent:  The patient/guardian has verbally consented to: the potential risks and benefits of telemedicine (video visit) versus in person care; bill my insurance or make self-payment for services provided; and responsibility for payment of non-covered services.     Patient would like the video invitation sent by:  My Chart    Mode of Communication:  Video Conference via [employer]    Distant Location (Provider):  On-site    As the provider I attest to compliance with applicable laws and regulations related to telemedicine.    DSM-5 Diagnoses:  Encounter Diagnosis   Name Primary?     Adjustment disorder with mixed emotional features Yes       Current Reported Symptoms and Status update:  Sadness, anxiety, in reaction to stress or changes        Changes since last session - have not met in several months. Due to farther along in the divorce process, Herminia noted that she has had more ups and down in the past few weeks than previous months. She stated that at times she feels sad, anxious, and not focused - all leading to a sense of not being  real. She feels this the most when alone.     Progress Toward Treatment Goals:   Worsening Revise tx plan at next session.     Therapeutic Interventions/Treatment Strategies:    Area(s) of treatment focus addressed in this session included Symptom Management    Has decisions to make about housing after the divorce. Anxiety about finances.  Option of co-owning house after the divorce.    Six things that a successful person does - helpful framing.  Today's session was getting updates on changes, processing upcoming decisions.     Treatment modalities used include Interpersonal  Support    Patient Response:   Patient responded to session by actively engaged  Possible barriers to participation / learning include: contextual issues - actively in divorce process    Current Mental Status Exam:   Appearance:  Appropriate   Eye Contact:  Good   Attitude / Demeanor: Cooperative   Speech      Rate / Production: Normal/ Responsive      Volume:  Normal  volume  Orientation:  All  Mood:   Sad   Affect:   Appropriate   Thought Content: Clear   Insight:   Good       Plan/Need for Future Services:  Return for therapy in 4 weeks to treat diagnosed problems.    Patient has a current master individualized treatment plan.  See Epic treatment plan for more information.    Referral / Collaboration:  Referral to another professional/service is not indicated at this time..  Emergency Services Needed?  No    Assignment:  Review decision making sheet    Interactive Complexity:  There are four specific communication difficulties that complicate the work of the primary psychiatric procedure.  Interactive complexity (+33718) may be reported when at least one of these difficulties is present.    Communication difficulties present during current the psychiatric procedure include:  1. None.      Signature/Title:    Diane Menendez, Ph.D, LMFT

## 2023-01-24 ENCOUNTER — VIRTUAL VISIT (OUTPATIENT)
Dept: PSYCHOLOGY | Facility: CLINIC | Age: 68
End: 2023-01-24
Payer: COMMERCIAL

## 2023-01-24 DIAGNOSIS — F43.29 ADJUSTMENT DISORDER WITH MIXED EMOTIONAL FEATURES: Primary | ICD-10-CM

## 2023-01-24 PROCEDURE — 90837 PSYTX W PT 60 MINUTES: CPT | Mod: 95 | Performed by: MARRIAGE & FAMILY THERAPIST
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
## 2023-02-18 ENCOUNTER — HEALTH MAINTENANCE LETTER (OUTPATIENT)
Age: 68
End: 2023-02-18

## 2023-03-14 ENCOUNTER — VIRTUAL VISIT (OUTPATIENT)
Dept: PSYCHOLOGY | Facility: CLINIC | Age: 68
End: 2023-03-14
Payer: COMMERCIAL

## 2023-03-14 DIAGNOSIS — F43.29 ADJUSTMENT DISORDER WITH MIXED EMOTIONAL FEATURES: Primary | ICD-10-CM

## 2023-03-14 PROCEDURE — 90837 PSYTX W PT 60 MINUTES: CPT | Mod: VID | Performed by: MARRIAGE & FAMILY THERAPIST

## 2023-03-14 NOTE — PROGRESS NOTES
[facility] - Progress Note    Date of Service: 3/14/23   Name: Herminia Garcia  : 1955  Medical Record Number: [id number]  Treating Provider: Diane Menendez, Ph.D, LMFT  Type of Session: Individual  Present in Session: Herminia  Session Start and Stop Time: 3:00-3:58  Number of Minutes:  58    SERVICE MODALITY:  Video Visit:      Provider verified identity through the following two step process.  Patient provided:  Patient is known previously to provider    Telemedicine Visit: The patient's condition can be safely assessed and treated via synchronous audio and visual telemedicine encounter.      Reason for Telemedicine Visit: Patient has requested telehealth visit    Originating Site (Patient Location): Patient's home    Distant Site (Provider Location): [facility]    Consent:  The patient/guardian has verbally consented to: the potential risks and benefits of telemedicine (video visit) versus in person care; bill my insurance or make self-payment for services provided; and responsibility for payment of non-covered services.     Patient would like the video invitation sent by:  Send to e-mail at: [email or web address]    Mode of Communication:  Video Conference via [employer]    Distant Location (Provider):  On-site    As the provider I attest to compliance with applicable laws and regulations related to telemedicine.    DSM-5 Diagnoses:  Encounter Diagnosis   Name Primary?     Adjustment disorder with mixed emotional features Yes       Current Reported Symptoms and Status update:  Sadness, anxiety, in reaction to stress or changes      Changes since last session - less sad, moving through emotions and staying with them for shorter periods.   House closing - 3 weeks; divorce to go through after; mixed emotions to changes. Loss, grief, losing the albatross of home repair, hopes to feel less lonely.     Progress Toward Treatment Goals:    Satisfactory progress     Therapeutic Interventions/Treatment Strategies:    Area(s) of treatment focus addressed in this session included Symptom Management; maintenance of change    Reviewed changes, encouraging use of existing coping skills. Reviewed progress. May choose to return to therapy, but no appointments scheduled. Goals are mostly satisfied, though some days old issues return.    Treatment modalities used include Solution Focused Therapy  Support    Patient Response:   Patient responded to session by accepting feedback  Possible barriers to participation / learning include: N/A    Current Mental Status Exam:   Appearance:  Appropriate   Eye Contact:  Good   Attitude / Demeanor: Cooperative   Speech      Rate / Production: Normal/ Responsive      Volume:  Normal  volume  Orientation:  All  Mood:   Normal  Affect:   Appropriate   Thought Content: Clear   Insight:   Good       Plan/Need for Future Services:  Return for therapy as needed; due to amount of changes, not ready to terminate but will continue to monitor need.    Patient has former treatment plan used for maintenance sessions. Will rewrite as needed.    Referral / Collaboration:  Referral to another professional/service is not indicated at this time..  Emergency Services Needed?  No    Assignment:  Use existing coping skills - social support, expression of needs, exercise    Interactive Complexity:  There are four specific communication difficulties that complicate the work of the primary psychiatric procedure.  Interactive complexity (+68014) may be reported when at least one of these difficulties is present.    Communication difficulties present during current the psychiatric procedure include:  1. None.      Signature/Title:    Diane Menendez, Ph.D, LMFT

## 2024-03-16 ENCOUNTER — HEALTH MAINTENANCE LETTER (OUTPATIENT)
Age: 69
End: 2024-03-16

## 2024-10-07 ENCOUNTER — VIRTUAL VISIT (OUTPATIENT)
Dept: PSYCHOLOGY | Facility: CLINIC | Age: 69
End: 2024-10-07
Payer: COMMERCIAL

## 2024-10-07 DIAGNOSIS — F41.9 ANXIETY: Primary | ICD-10-CM

## 2024-10-07 PROCEDURE — 90791 PSYCH DIAGNOSTIC EVALUATION: CPT | Mod: 95 | Performed by: MARRIAGE & FAMILY THERAPIST

## 2024-10-07 NOTE — NURSING NOTE
Is the patient currently in the state of [place]? YES    Current patient location: [address] [place] [address]   [facility] [id number]    Visit mode:VIDEO    If the visit is dropped, the patient can be reconnected by: VIDEO VISIT:  Send e-mail to at [email or web address]    Will anyone else be joining the visit? No  (If patient encounters technical issues they should call [phone])    Are changes needed to the allergy or medication list? N/A    Are refills needed on medications prescribed by this physician? No    Rooming Documentation: Questionnaire(s) not done per department protocol.    Reason for visit: Consult     SHELBI Smart
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
## 2025-02-22 ENCOUNTER — HEALTH MAINTENANCE LETTER (OUTPATIENT)
Age: 70
End: 2025-02-22

## 2025-03-11 ENCOUNTER — VIRTUAL VISIT (OUTPATIENT)
Dept: PSYCHOLOGY | Facility: CLINIC | Age: 70
End: 2025-03-11
Payer: COMMERCIAL

## 2025-03-11 DIAGNOSIS — F41.9 ANXIETY: Primary | ICD-10-CM
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
## 2025-03-11 NOTE — NURSING NOTE
Is the patient currently in the state of [place]? YES    Current patient location: [address] [place] [address]   [facility] [address]    Visit mode:Video    If the visit is dropped, the patient can be reconnected by: VIDEO VISIT: Text to cell phone:   Telephone Information:   Mobile [phone]    and VIDEO VISIT:  Send e-mail to at [email or web address]    Will anyone else be joining the visit? No  (If patient encounters technical issues they should call [phone])    Are changes needed to the allergy or medication list? N/A    Are refills needed on medications prescribed by this physician? No    Rooming Documentation: Questionnaire(s) not done per department protocol.    Reason for visit: RECHECK     Pamela Mcdonnell, ELMERF

## 2025-04-22 ENCOUNTER — VIRTUAL VISIT (OUTPATIENT)
Dept: PSYCHOLOGY | Facility: CLINIC | Age: 70
End: 2025-04-22
Payer: COMMERCIAL

## 2025-04-22 DIAGNOSIS — F41.9 ANXIETY: Primary | ICD-10-CM

## 2025-04-22 NOTE — NURSING NOTE
Current patient location: [address] [place] [address]   [facility] [address]    Is the patient currently in the state of [place]? YES    Visit mode: VIDEO    If the visit is dropped, the patient can be reconnected by:VIDEO VISIT: Text to cell phone:   Telephone Information:   Mobile [phone]       Will anyone else be joining the visit? NO  (If patient encounters technical issues they should call [phone]    Are changes needed to the allergy or medication list? N/A    Are refills needed on medications prescribed by this physician? NO    Rooming Documentation:  Not applicable    Reason for visit: RECHECK    Trisha MARIO

## 2025-04-22 NOTE — PROGRESS NOTES
[facility] Health Clinic - Progress Note    Date of Service: 25   Name: Herminia Garcia  : 1955  Medical Record Number: [id number]  Treating Provider: ELIJAH Velez  Type of Session: Individual  Present in Session: Herminia  Session Start and Stop Time: 3:00-3:57  Number of Minutes:  57    SERVICE MODALITY:  Video Visit:      Provider verified identity through the following two step process.  Patient provided:  Patient is known previously to provider    Telemedicine Visit: The patient's condition can be safely assessed and treated via synchronous audio and visual telemedicine encounter.      Reason for Telemedicine Visit: Patient has requested telehealth visit    Originating Site (Patient Location): Patient's home    Distant Site (Provider Location): [facility]    Consent:  The patient/guardian has verbally consented to: the potential risks and benefits of telemedicine (video visit) versus in person care; bill my insurance or make self-payment for services provided; and responsibility for payment of non-covered services.     Patient would like the video invitation sent by:  Send to e-mail at: [email or web address]    Mode of Communication:  Video Conference via Amwell    Distant Location (Provider):  On-site    As the provider I attest to compliance with applicable laws and regulations related to telemedicine.    DSM-5 Diagnoses:  Encounter Diagnosis   Name Primary?    Anxiety Yes        Current Reported Symptoms and Status update:  Sadness, anxiety, in reaction to stress or changes      Changes since last session - mood good when running; moments of sadness and worries that accompany feelings of loneliness that can be brought on by specific stressors.    Progress Toward Treatment Goals:   Satisfactory progress     Therapeutic Interventions/Treatment Strategies:    Area(s) of treatment focus addressed in this session included Symptom  Management    Discussed recent moment of negative feelings that overwhelmed her for over a day. Trigger: partner not answering texts and worry about his well-being. Discussed reasons for this trigger - fear of aging, fear of further loss. She noted that he would like to get  and she would like this security, but is also afraid of committing long-term. We processed what security means for her and how she does and does not feel it in her relationship. Reflection question - what does marriage look like in one's 70's/90's? How is this different from the image she has in her mind of marriage being the launching point of starting one's life.     Treatment modalities used include Interpersonal  Support and Feedback    Patient Response:   Patient responded to session by actively engaged  Possible barriers to participation / learning include: N/A    Current Mental Status Exam:   Appearance:  Appropriate   Eye Contact:  Good   Attitude / Demeanor: Cooperative   Speech      Rate / Production: Normal/ Responsive      Volume:  Normal  volume  Orientation:  All  Mood:   Normal  Affect:   Appropriate   Thought Content: Clear   Insight:   Good       Plan/Need for Future Services:  Return for therapy in 3 weeks to treat diagnosed problems.    Patient has a current master individualized treatment plan.  See Epic treatment plan for more information.    Referral / Collaboration:  Referral to another professional/service is not indicated at this time..  Emergency Services Needed?  No    Assignment:  Reflection question    Interactive Complexity:  There are four specific communication difficulties that complicate the work of the primary psychiatric procedure.  Interactive complexity (+83966) may be reported when at least one of these difficulties is present.    Communication difficulties present during current the psychiatric procedure include:  None.      Signature/Title:    Diane Menendez, PhD, LMFT

## 2025-05-13 ENCOUNTER — VIRTUAL VISIT (OUTPATIENT)
Dept: PSYCHOLOGY | Facility: CLINIC | Age: 70
End: 2025-05-13
Payer: COMMERCIAL

## 2025-05-13 DIAGNOSIS — F41.9 ANXIETY: Primary | ICD-10-CM

## 2025-05-13 NOTE — NURSING NOTE
Current patient location: [address] [place] [address] S   [facility] [id number]    Is the patient currently in the state of [place]? YES    Visit mode: VIDEO    If the visit is dropped, the patient can be reconnected by:VIDEO VISIT: Send to e-mail at: [email or web address]    Will anyone else be joining the visit? NO  (If patient encounters technical issues they should call [phone]    Are changes needed to the allergy or medication list? N/A    Are refills needed on medications prescribed by this physician? NO    Rooming Documentation:  Not applicable    Reason for visit: TREVOR MARIO
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
## 2025-06-11 ENCOUNTER — VIRTUAL VISIT (OUTPATIENT)
Dept: PSYCHOLOGY | Facility: CLINIC | Age: 70
End: 2025-06-11
Payer: COMMERCIAL

## 2025-06-11 DIAGNOSIS — F41.9 ANXIETY: Primary | ICD-10-CM

## 2025-06-11 NOTE — PROGRESS NOTES
[facility] - Progress Note    Date of Service: 25   Name: Herminia Garcia  : 1955  Medical Record Number: [id number]  Treating Provider: ELIJAH Velez  Type of Session: Individual  Present in Session: Herminia  Session Start and Stop Time: 2:03-3:00  Number of Minutes:  57    SERVICE MODALITY:  Video Visit:      Provider verified identity through the following two step process.  Patient provided:  Patient is known previously to provider    Telemedicine Visit: The patient's condition can be safely assessed and treated via synchronous audio and visual telemedicine encounter.      Reason for Telemedicine Visit: Patient has requested telehealth visit    Originating Site (Patient Location): Patient's home    Distant Site (Provider Location): [facility]    Consent:  The patient/guardian has verbally consented to: the potential risks and benefits of telemedicine (video visit) versus in person care; bill my insurance or make self-payment for services provided; and responsibility for payment of non-covered services.     Patient would like the video invitation sent by:  Send to e-mail at: [email or web address]    Mode of Communication:  Video Conference via Amwell    Distant Location (Provider):  On-site    As the provider I attest to compliance with applicable laws and regulations related to telemedicine.    DSM-5 Diagnoses:  Encounter Diagnosis   Name Primary?    Anxiety Yes        Current Reported Symptoms and Status update:  Sadness, anxiety, in reaction to stress or changes      Changes since last session - distressing interactions with family    Progress Toward Treatment Goals:   Satisfactory progress     Therapeutic Interventions/Treatment Strategies:    Area(s) of treatment focus addressed in this session included Symptom Management    Discussed her overall sense of not belonging in her current space - which belongs to her partner's  daughter. She noted a feeling of being homeless. Identified that partner's daughter can be intimidating to her. Processed reasons for this sense and ways to feel grounded in her right to be. Noted that she has little of her own space and control of her space and how that is impacting her overall sense of well-being. Also processed the role that money (lack of, where it comes from) plays into a sense of low worth and confidence.    Treatment modalities used include Solution Focused Therapy  Support and Feedback    Patient Response:   Patient responded to session by actively engaged  Possible barriers to participation / learning include: contextual issues    Current Mental Status Exam:   Appearance:  Appropriate   Eye Contact:  Good   Attitude / Demeanor: Cooperative   Speech      Rate / Production: Normal/ Responsive      Volume:  Normal  volume  Orientation:  All  Mood:   Anxious  Sad   Affect:   Worrisome   Thought Content: Clear   Insight:   Good       Plan/Need for Future Services:  Return for therapy in 4 weeks to treat diagnosed problems.    Patient has a current master individualized treatment plan.  See Epic treatment plan for more information.    Referral / Collaboration:  Referral to another professional/service is not indicated at this time..  Emergency Services Needed?  No    Assignment:  Ground in her sense of being    Interactive Complexity:  There are four specific communication difficulties that complicate the work of the primary psychiatric procedure.  Interactive complexity (+06426) may be reported when at least one of these difficulties is present.    Communication difficulties present during current the psychiatric procedure include:  None.      Signature/Title:    Diane Menendez, PhD, LMFT

## 2025-06-11 NOTE — NURSING NOTE
Current patient location: [address] [place] [address] S   [facility] [address]    Is the patient currently in the state of [place]? YES    Visit mode: VIDEO    If the visit is dropped, the patient can be reconnected by:VIDEO VISIT: Send to e-mail at: [email or web address]    Will anyone else be joining the visit? NO  (If patient encounters technical issues they should call [phone]    Are changes needed to the allergy or medication list? N/A    Are refills needed on medications prescribed by this physician? NO    Rooming Documentation:  Questionnaire(s) completed    Reason for visit: RECHECK    Carmella MARIO

## 2025-07-09 ENCOUNTER — VIRTUAL VISIT (OUTPATIENT)
Dept: PSYCHOLOGY | Facility: CLINIC | Age: 70
End: 2025-07-09
Payer: COMMERCIAL

## 2025-07-09 DIAGNOSIS — F41.9 ANXIETY: Primary | ICD-10-CM

## 2025-07-09 NOTE — NURSING NOTE
Current patient location: [address] [place] [address]   [facility] [address]    Is the patient currently in the state of [place]? YES    Visit mode: VIDEO    If the visit is dropped, the patient can be reconnected by:VIDEO VISIT: Text to cell phone:   Telephone Information:   Mobile [phone]       Will anyone else be joining the visit? NO  (If patient encounters technical issues they should call [phone]    Are changes needed to the allergy or medication list? N/A    Are refills needed on medications prescribed by this physician? NO    Rooming Documentation:  Not applicable    Reason for visit: RECHECK    Monie PAYNEF

## 2025-07-09 NOTE — PROGRESS NOTES
[facility] - Progress Note    Date of Service: 25   Name: Herminia Garcia  : 1955  Medical Record Number: [id number]  Treating Provider: ELIJAH Velez  Type of Session: Individual  Present in Session: Herminia  Session Start and Stop Time: 2:00-2:55  Number of Minutes:  55    SERVICE MODALITY:  Video Visit:      Provider verified identity through the following two step process.  Patient provided:  Patient is known previously to provider    Telemedicine Visit: The patient's condition can be safely assessed and treated via synchronous audio and visual telemedicine encounter.      Reason for Telemedicine Visit: Patient has requested telehealth visit    Originating Site (Patient Location): Patient's home    Distant Site (Provider Location): [facility]    Consent:  The patient/guardian has verbally consented to: the potential risks and benefits of telemedicine (video visit) versus in person care; bill my insurance or make self-payment for services provided; and responsibility for payment of non-covered services.     Patient would like the video invitation sent by:  Send to e-mail at: [email or web address]    Mode of Communication:  Video Conference via Amwell    Distant Location (Provider):  On-site    As the provider I attest to compliance with applicable laws and regulations related to telemedicine.    DSM-5 Diagnoses:  Encounter Diagnosis   Name Primary?    Anxiety Yes        Current Reported Symptoms and Status update:  Sadness, anxiety, in reaction to stress or changes      Changes since last session - noted less reactivity    Progress Toward Treatment Goals:   Satisfactory progress     Therapeutic Interventions/Treatment Strategies:    Area(s) of treatment focus addressed in this session included Symptom Management    Discussed ways she is addressing her moments in which she feels lonely, anxious, and lacking some belonging. She  noted that she is able to better identify these feelings earlier and address. She had good visits with family members. She has had difficulties voicing some of these feelings as they relate to late evenings and we developed a plan for her talking to her partner about this. She stated that he is having a hip replacement in 2 weeks and she has some concerns about the amount of caretaking this will involve. Discussed plans for self-care. She noted that this and seeing her sister aging has brought on heavy feelings about aging and death. Suggested Fredy Cason's book.      Treatment modalities used include Solution Focused Therapy Interpersonal  Support and Feedback    Patient Response:   Patient responded to session by actively engaged  Possible barriers to participation / learning include: N/A    Current Mental Status Exam:   Appearance:  Appropriate   Eye Contact:  Good   Attitude / Demeanor: Cooperative   Speech      Rate / Production: Normal/ Responsive      Volume:  Normal  volume  Orientation:  All  Mood:   Normal  Affect:   Appropriate   Thought Content: Clear   Insight:   Good       Plan/Need for Future Services:  Return for therapy in 4 weeks to treat diagnosed problems.    Patient has a current master individualized treatment plan.  See Epic treatment plan for more information.    Referral / Collaboration:  Told Herminia that I will be on sabbatical sept - march and asked her to think about whether or not she would like to pause, terminate, or transfer.  Emergency Services Needed?  No    Assignment:  [place]    Interactive Complexity:  There are four specific communication difficulties that complicate the work of the primary psychiatric procedure.  Interactive complexity (+42360) may be reported when at least one of these difficulties is present.    Communication difficulties present during current the psychiatric procedure include:  None.      Signature/Title:    Diane Menendez, PhD, LMFT

## 2025-07-26 ENCOUNTER — HEALTH MAINTENANCE LETTER (OUTPATIENT)
Age: 70
End: 2025-07-26

## 2025-08-13 ENCOUNTER — VIRTUAL VISIT (OUTPATIENT)
Dept: PSYCHOLOGY | Facility: CLINIC | Age: 70
End: 2025-08-13
Payer: COMMERCIAL

## 2025-08-13 DIAGNOSIS — F41.9 ANXIETY: Primary | ICD-10-CM

## 2025-08-13 PROCEDURE — 90837 PSYTX W PT 60 MINUTES: CPT | Mod: 95 | Performed by: MARRIAGE & FAMILY THERAPIST
